# Patient Record
Sex: MALE | Race: WHITE | Employment: OTHER | RURAL
[De-identification: names, ages, dates, MRNs, and addresses within clinical notes are randomized per-mention and may not be internally consistent; named-entity substitution may affect disease eponyms.]

---

## 2017-02-13 RX ORDER — CITALOPRAM 20 MG/1
20 TABLET, FILM COATED ORAL DAILY
COMMUNITY
Start: 2016-11-14 | End: 2017-02-13 | Stop reason: SDUPTHER

## 2017-02-13 RX ORDER — GLIMEPIRIDE 4 MG/1
4 TABLET ORAL 2 TIMES DAILY
COMMUNITY
Start: 2016-11-14 | End: 2017-02-13 | Stop reason: SDUPTHER

## 2017-02-13 RX ORDER — CITALOPRAM 20 MG/1
20 TABLET, FILM COATED ORAL DAILY
Qty: 7 TAB | Refills: 0 | Status: SHIPPED | OUTPATIENT
Start: 2017-02-13 | End: 2017-02-28 | Stop reason: ALTCHOICE

## 2017-02-13 RX ORDER — AMLODIPINE BESYLATE 10 MG/1
10 TABLET ORAL DAILY
COMMUNITY
Start: 2016-11-14 | End: 2017-02-13 | Stop reason: SDUPTHER

## 2017-02-13 RX ORDER — AMLODIPINE BESYLATE 10 MG/1
10 TABLET ORAL DAILY
Qty: 7 TAB | Refills: 0 | Status: SHIPPED | OUTPATIENT
Start: 2017-02-13 | End: 2017-02-28 | Stop reason: SDUPTHER

## 2017-02-13 RX ORDER — GLIMEPIRIDE 4 MG/1
TABLET ORAL
Qty: 14 TAB | Refills: 0 | Status: SHIPPED | OUTPATIENT
Start: 2017-02-13 | End: 2017-02-28 | Stop reason: SDUPTHER

## 2017-02-28 ENCOUNTER — OFFICE VISIT (OUTPATIENT)
Dept: FAMILY MEDICINE CLINIC | Age: 42
End: 2017-02-28

## 2017-02-28 VITALS
BODY MASS INDEX: 28.14 KG/M2 | TEMPERATURE: 97 F | DIASTOLIC BLOOD PRESSURE: 96 MMHG | HEIGHT: 69 IN | RESPIRATION RATE: 22 BRPM | OXYGEN SATURATION: 97 % | WEIGHT: 190 LBS | HEART RATE: 82 BPM | SYSTOLIC BLOOD PRESSURE: 124 MMHG

## 2017-02-28 DIAGNOSIS — E11.9 TYPE 2 DIABETES MELLITUS WITHOUT COMPLICATION, WITHOUT LONG-TERM CURRENT USE OF INSULIN (HCC): Primary | ICD-10-CM

## 2017-02-28 DIAGNOSIS — I10 ESSENTIAL HYPERTENSION: ICD-10-CM

## 2017-02-28 DIAGNOSIS — F41.9 ANXIETY: ICD-10-CM

## 2017-02-28 DIAGNOSIS — E78.00 PURE HYPERCHOLESTEROLEMIA: ICD-10-CM

## 2017-02-28 DIAGNOSIS — F17.200 SMOKING: ICD-10-CM

## 2017-02-28 DIAGNOSIS — Z23 ENCOUNTER FOR IMMUNIZATION: ICD-10-CM

## 2017-02-28 RX ORDER — PRAVASTATIN SODIUM 10 MG/1
TABLET ORAL
Refills: 2 | COMMUNITY
Start: 2017-01-16 | End: 2017-02-28 | Stop reason: SDUPTHER

## 2017-02-28 RX ORDER — NORTRIPTYLINE HYDROCHLORIDE 25 MG/1
25 CAPSULE ORAL
Qty: 30 CAP | Refills: 11 | Status: SHIPPED | OUTPATIENT
Start: 2017-02-28 | End: 2017-06-27 | Stop reason: ALTCHOICE

## 2017-02-28 RX ORDER — LOSARTAN POTASSIUM 100 MG/1
TABLET ORAL
Qty: 30 TAB | Refills: 11 | Status: SHIPPED | OUTPATIENT
Start: 2017-02-28 | End: 2018-03-29 | Stop reason: ALTCHOICE

## 2017-02-28 RX ORDER — PRAVASTATIN SODIUM 10 MG/1
TABLET ORAL
Qty: 30 TAB | Refills: 11 | Status: SHIPPED | OUTPATIENT
Start: 2017-02-28 | End: 2018-03-03 | Stop reason: SDUPTHER

## 2017-02-28 RX ORDER — GLIMEPIRIDE 4 MG/1
TABLET ORAL
Qty: 60 TAB | Refills: 11 | Status: SHIPPED | OUTPATIENT
Start: 2017-02-28 | End: 2018-03-03 | Stop reason: SDUPTHER

## 2017-02-28 RX ORDER — METFORMIN HYDROCHLORIDE 1000 MG/1
TABLET ORAL
Refills: 6 | COMMUNITY
Start: 2017-02-10 | End: 2017-02-28 | Stop reason: SDUPTHER

## 2017-02-28 RX ORDER — PRAVASTATIN SODIUM 10 MG/1
TABLET ORAL
COMMUNITY
Start: 2016-11-14 | End: 2016-11-14

## 2017-02-28 RX ORDER — LOSARTAN POTASSIUM 100 MG/1
TABLET ORAL
Refills: 9 | COMMUNITY
Start: 2017-02-12 | End: 2017-02-28 | Stop reason: SDUPTHER

## 2017-02-28 RX ORDER — AMLODIPINE BESYLATE 10 MG/1
10 TABLET ORAL DAILY
Qty: 30 TAB | Refills: 11 | Status: SHIPPED | OUTPATIENT
Start: 2017-02-28 | End: 2018-03-03 | Stop reason: SDUPTHER

## 2017-02-28 RX ORDER — CITALOPRAM 20 MG/1
20 TABLET, FILM COATED ORAL DAILY
Qty: 7 TAB | Refills: 0 | Status: CANCELLED | OUTPATIENT
Start: 2017-02-28

## 2017-02-28 RX ORDER — METFORMIN HYDROCHLORIDE 1000 MG/1
TABLET ORAL
Qty: 45 TAB | Refills: 11 | Status: SHIPPED | OUTPATIENT
Start: 2017-02-28 | End: 2017-04-14 | Stop reason: SDUPTHER

## 2017-02-28 RX ORDER — LOSARTAN POTASSIUM 100 MG/1
TABLET ORAL
COMMUNITY
Start: 2016-11-14 | End: 2016-11-14

## 2017-02-28 RX ORDER — METFORMIN HYDROCHLORIDE 1000 MG/1
TABLET ORAL
COMMUNITY
Start: 2016-11-14 | End: 2016-11-14

## 2017-02-28 NOTE — MR AVS SNAPSHOT
Visit Information Date & Time Provider Department Dept. Phone Encounter #  
 2/28/2017  1:00 PM Aditya Anrdade, [facility]. [facility] 58 [id number] Follow-up Instructions Return in about 2 months (around 4/28/2017). Upcoming Health Maintenance Date Due HEMOGLOBIN A1C Q6M 1974 LIPID PANEL Q1 1974 FOOT EXAM Q1 8/21/1984 MICROALBUMIN Q1 8/21/1984 EYE EXAM RETINAL OR DILATED Q1 8/21/1984 Pneumococcal 19-64 Medium Risk (1 of 1 - PPSV23) 8/21/1993 DTaP/Tdap/Td series (1 - Tdap) 8/21/1995 INFLUENZA AGE 9 TO ADULT 9/16/2017* *Topic was postponed. The date shown is not the original due date. Allergies as of 2/28/2017  Review Complete On: 2/28/2017 By: Aditya Andrade MD  
 No Known Allergies Current Immunizations  Never Reviewed Name Date Pneumococcal Conjugate (PCV-13)  Incomplete Not reviewed this visit You Were Diagnosed With   
  
 Codes Comments Type 2 diabetes mellitus without complication, without long-term current use of insulin (HCC)    -  Primary ICD-10-CM: E11.9 ICD-9-CM: 250.00 Anxiety     ICD-10-CM: F41.9 ICD-9-CM: 300.00 Essential hypertension     ICD-10-CM: I10 
ICD-9-CM: 401.9 Pure hypercholesterolemia     ICD-10-CM: E78.00 ICD-9-CM: 272.0 Smoking     ICD-10-CM: F17.200 ICD-9-CM: 305.1 Encounter for immunization     ICD-10-CM: O68 ICD-9-CM: V03.89 Vitals BP  
  
  
  
  
  
 (!) 124/96 (BP 1 Location: Left arm, BP Patient Position: Sitting) BMI and BSA Data Body Mass Index Body Surface Area  
 28.47 kg/m 2 2.04 m 2 Preferred Pharmacy Pharmacy Name Phone [address], [address] AT [facility]  [address] [facility]. Nancy Vaughnlobito [phone] Your Updated Medication List  
  
   
This list is accurate as of: 2/28/17  2:14 PM.  Always use your most recent med list.  
  
  
  
  
 amLODIPine 10 mg tablet Commonly known as:  Laly Subramanian Take 1 Tab by mouth daily. Indications: pressure  
  
 glimepiride 4 mg tablet Commonly known as:  AMARYL  
4 mg 2 times per day  
  
 losartan 100 mg tablet Commonly known as:  COZAAR  
1 po daily for pressure  
  
 metFORMIN 1,000 mg tablet Commonly known as:  GLUCOPHAGE  
1.5 tab in AM and 1 in evening for sugar  Indications: sugar  
  
 nortriptyline 25 mg capsule Commonly known as:  PAMELOR Take 1 Cap by mouth nightly. Indications: nerves and sleep  
  
 pravastatin 10 mg tablet Commonly known as:  PRAVACHOL TK 1 T PO AT BEDTIME FOR CHOLESTEROL Prescriptions Sent to Pharmacy Refills  
 losartan (COZAAR) 100 mg tablet 11 Si po daily for pressure Class: Normal  
 Pharmacy: [address]  Ph #: [phone]  
 metFORMIN (GLUCOPHAGE) 1,000 mg tablet 11 Si.5 tab in AM and 1 in evening for sugar  Indications: sugar Class: Normal  
 Pharmacy: [address]  Ph #: [phone]  
 pravastatin (PRAVACHOL) 10 mg tablet 11 Sig: TK 1 T PO AT BEDTIME FOR CHOLESTEROL Class: Normal  
 Pharmacy: [address]  Ph #: [phone]  
 amLODIPine (NORVASC) 10 mg tablet 11 Sig: Take 1 Tab by mouth daily. Indications: pressure Class: Normal  
 Pharmacy: [address]  Ph #: [phone] Route: Oral  
 glimepiride (AMARYL) 4 mg tablet 11 Si mg 2 times per day Class: Normal  
 Pharmacy: [address]  H[address] Ph #: [phone]  
 nortriptyline (PAMELOR) 25 mg capsule 11  
 Sig: Take 1 Cap by mouth nightly. Indications: nerves and sleep Class: Normal  
 Pharmacy: [email or web address] [place] [address], [address] [facility] [address] [place] Ph #: [phone] Route: Oral  
  
We Performed the Following HEMOGLOBIN A1C WITH EAG [50706 CPT(R)]  DIABETES FOOT EXAM [HM7 Custom] LIPID PANEL [77434 CPT(R)] METABOLIC PANEL, COMPREHENSIVE [97866 CPT(R)] MICROALBUMIN, UR, RAND W/ MICROALBUMIN/CREA RATIO [id number] CPT(R)] PNEUMOCOCCAL CONJ VACCINE 13 VALENT IM [id number] CPT(R)] Follow-up Instructions Return in about 2 months (around 4/28/2017). Patient Instructions Pneumococcal 13-Valent Vaccine, Diphtheria Conjugate (By injection) Pneumococcal 13-Valent Vaccine, Diphtheria Conjugate (QYC-jdg-WYX-al 13-VAY-lent VAX-een, dif-THEER-ee-a TEP-sjd-zxft) Prevents infections, such as pneumonia and meningitis. Brand Name(s):Prevnar 13 There may be other brand names for this medicine. When This Medicine Should Not Be Used: This vaccine is not right for everyone. You should not receive this vaccine if you had an allergic reaction to pneumococcal or diphtheria vaccine. How to Use This Medicine:  
Injectable · A nurse or other health provider will give you this medicine. This vaccine is usually given as a shot into a muscle in the thigh or upper arm. · The vaccine schedule is different for different people. ¨ Tell your doctor if your child was born prematurely. Children who were premature may need to follow a different schedule. ¨ Children under 6: This vaccine is usually given as 3 or 4 separate shots over several months. Your child's doctor will tell you how many shots are needed and when to come back for the next one. ¨ Children over 6: This vaccine is given as a single shot. If your child recently received another pneumonia vaccine, this one should be given at least 8 weeks later. ¨ Adults over 50: This vaccine is given as a single dose. · It is very important for your child to receive all of the shots for the vaccine. · Missed dose: This vaccine must be given on a fixed schedule. If your child misses a dose, call your child's doctor for another appointment. Drugs and Foods to Avoid: Ask your doctor or pharmacist before using any other medicine, including over-the-counter medicines, vitamins, and herbal products. · Some medicines can affect how this vaccine works. Tell your doctor if you are receiving a treatment or medicine that causes a weak immune system. This includes radiation treatment, steroid medicine (such as hydrocortisone, methylprednisolone, prednisolone, prednisone), or cancer medicine. Warnings While Using This Medicine: · Adults and adolescents: Tell your doctor if you are pregnant or breastfeeding. · Tell your doctor if you have a weak immune system. You may not be fully protected by this vaccine. Possible Side Effects While Using This Medicine:  
Call your doctor right away if you notice any of these side effects: · Allergic reaction: Itching or hives, swelling in your face or hands, swelling or tingling in your mouth or throat, chest tightness, trouble breathing · High fever If you notice these less serious side effects, talk with your doctor: · Crying, irritability, or fussiness · Joint or muscle pain · Mild skin rash · Pain, burning, redness, or swelling where the shot was given · Poor appetite · Sleep changes If you notice other side effects that you think are caused by this medicine, tell your doctor. Call your doctor for medical advice about side effects. You may report side effects to FDA at 8-454-CEN-0165 © 2016 [address] is for End User's use only and may not be sold, redistributed or otherwise used for commercial purposes. The above information is an  only.  It is not intended as medical advice for individual conditions or treatments. Talk to your doctor, nurse or pharmacist before following any medical regimen to see if it is safe and effective for you. Introducing [facility] & HEALTH SERVICES! Jaci Griffith introduces [employer] patient portal. Now you can access parts of your medical record, email your doctor's office, and request medication refills online. 1. In your internet browser, go to [email or web address] 2. Click on the First Time User? Click Here link in the Sign In box. You will see the New Member Sign Up page. 3. Enter your [employer] Access Code exactly as it appears below. You will not need to use this code after youve completed the sign-up process. If you do not sign up before the expiration date, you must request a new code. · [employer] Access Code: [email or web address] Expires: 5/29/2017  2:14 PM 
 
4. Enter the last four digits of your Social Security Number (xxxx) and Date of Birth (mm/dd/yyyy) as indicated and click Submit. You will be taken to the next sign-up page. 5. Create a [employer] ID. This will be your [employer] login ID and cannot be changed, so think of one that is secure and easy to remember. 6. Create a [employer] password. You can change your password at any time. 7. Enter your Password Reset Question and Answer. This can be used at a later time if you forget your password. 8. Enter your e-mail address. You will receive e-mail notification when new information is available in [address] 9. Click Sign Up. You can now view and download portions of your medical record. 10. Click the Download Summary menu link to download a portable copy of your medical information. If you have questions, please visit the Frequently Asked Questions section of the [employer] website. Remember, [employer] is NOT to be used for urgent needs. For medical emergencies, dial 911. Now available from your iPhone and Android! Please provide this summary of care documentation to your next provider. Your primary care clinician is listed as Turner Mooney. If you have any questions after today's visit, please call [phone].

## 2017-02-28 NOTE — PROGRESS NOTES
Neo Lopez is a 42 y.o. male presenting for/with:    Establish Care (transfer from [facility])    HPI:  Diabetes. Sugars controlled moderately  Hypoglycemia: none  Tolerating current treatment well  Current medications include metformin    No results found for: HBA1C, HGBE8, GLU, GESTF, GLUCPOC, MCALPOCT, MCACR, MCA1, MCA2, MCA3, MCA4, UMCA, MCAU, MCACRPOC, MICALBRAT, LDL, DLDL, LDLC, DLDLP, BISI, CREAPOC, MCREA, REFC7, ACREA, CREA, REFC3, REFC4, [id number]  No results found for: MCACR, MCA1, MCA2, MCA3, MCA4, UMCA, MCAU, MICALBRAT, MCAU2, MCALPOCT    Last eye exam performed  >1 year ago and was normal.  Last foot exam performed greather than 1 year ago. Hypertension. Blood pressures have been stable. Management at last visit included continuing current regimen . Current regimen: angiotensin II receptor antagonist and calcium channel blocker. Symptoms include no symptoms. Patient denies chest pain, palpitations, peripheral edema. Lab review: No results found for: NA, K, CL, CO2, AGAP, GLU, BUN, CREA, BUCR, GFRAA, GFRNA, CA, GFRAA    Hyperlipidemia. On pravastatin 10. Kasey well. No myalgias, arthralgias, unusual weakness. No results found for: CHOL, CHOLPOCT, CHOLX, CHLST, CHOLV, HDL, HDLPOC, LDL, LDLCPOC, NLDLCT, DLDL, LDLC, DLDLP, VLDLC, VLDL, TGL, TGLX, TRIGL, [id number], TRIGP, TGLPOCT, CHHD, CHHDX    No results found for: GPT, ALT, SGOT, GGT, GGTP, AP, APIT, APX, CBIL, TBIL, TBILI      Patient Active Problem List   Diagnosis Code    Hypertension I10    Anxiety F41.9    Type 2 diabetes mellitus without complication, without long-term current use of insulin (HCC) E11.9    Smoking F17.200    Pure hypercholesterolemia E78.00      reports that he has been smoking. He has a 37.50 pack-year smoking history. He does not have any smokeless tobacco history on file. He reports that he does not use illicit drugs.     Family History   Problem Relation Age of Onset    Heart Disease Father      Current Outpatient Prescriptions   Medication Sig    losartan (COZAAR) 100 mg tablet TK 1 T PO QD    metFORMIN (GLUCOPHAGE) 1,000 mg tablet     pravastatin (PRAVACHOL) 10 mg tablet TK 1 T PO AT BEDTIME FOR CHOLESTEROL    amLODIPine (NORVASC) 10 mg tablet Take 1 Tab by mouth daily.  glimepiride (AMARYL) 4 mg tablet 4 mg 2 times per day    citalopram (CELEXA) 20 mg tablet Take 1 Tab by mouth daily. No current facility-administered medications for this visit. No Known Allergies    Review of Systems:    Constitutional: Negative for fever and negative for weight loss. Eyes:  Negative for glaucoma and negative for recent changes in vision. ENT: negative for frequent ear infections, negative for frequent sinus infections, and negative for frequent sore throat. Cardiovascular:  Negative for chest pain or discomfort in chest and negative shortness of breath. Respiratory: negative for asthma, negative for bronchitis, negative for pneumonia,  negative for pleurisy, and  negative for TB. Gastrointestinal:  Negative for frequent indigestion or heartburn, negative for vomiting, and negative for bloody or black stools. Genitourinary:  Negative for hematuria and negative for dysuria. Musculoskeletal:  Negative for frequent fractures or sprains and negative for history of arthritis. Integument:  Negative for recent rash and negative for skin changes  Neurologic:  Negative for frequent headaches and negative for history of seizures or convulsions. Psychiatric:  POS for treatment for psychiatric problems, and negative for history of drug or alcohol treatment. Endocrine:  Negative for decreased energy and negative for dizziness. Hematology/Lymphatic:  Negative for easy bruising or bleeding. Allergic/Immunologic:  Negative for severe allergic reaction, and  negative for hay fever/allergies.     Visit Vitals    BP (!) 124/96 (BP 1 Location: Left arm, BP Patient Position: Sitting)    Pulse 82    Temp 97 °F (36.1 °C) (Temporal)    Resp 22    Ht 5' 8.5\" (1.74 m)    Wt 190 lb (86.2 kg)    SpO2 97%    BMI 28.47 kg/m2     Wt Readings from Last 3 Encounters:   02/28/17 190 lb (86.2 kg)     Physical Examination: General appearance - alert, well appearing, and in no distress  Mental status - alert, oriented to person, place, and time  Eyes - pupils equal and reactive, extraocular eye movements intact  ENT - bilateral external ears and nose normal. Normal lips  Neck - supple, no significant adenopathy, no thyromegaly or mass  Lymphatics - no palpable lymphadenopathy, no hepatosplenomegaly  Chest - clear to auscultation, no wheezes, rales or rhonchi, symmetric air entry  Heart - normal rate, regular rhythm, normal S1, S2, no murmurs, rubs, clicks or gallops  Abd - NABS. SNT, no HSM/Mass. Extremities - peripheral pulses normal, no pedal edema, no clubbing or cyanosis    A/P:  DM2  Fair control. con't current tx, check labs, adjust PRN. HLD  well controlled. con't current tx. Check labs, adjust PRN    HTN  A little up today. BP checks, if persistently >140/90, plan change to losartan /12.5    Anxiety  With restlessness. Not getting good control with citalopram. Discussed options. Try change to pamelor 25mg qhs, but if not richi well, consider zoloft 25's. F/U 2mo.

## 2017-02-28 NOTE — PATIENT INSTRUCTIONS
Pneumococcal 13-Valent Vaccine, Diphtheria Conjugate (By injection)   Pneumococcal 13-Valent Vaccine, Diphtheria Conjugate (IMH-yjq-JWT-al 13-VAY-lent VAX-een, dif-THEER-ee-a JKK-lxv-srcm)  Prevents infections, such as pneumonia and meningitis. Brand Name(s):Prevnar 13   There may be other brand names for this medicine. When This Medicine Should Not Be Used: This vaccine is not right for everyone. You should not receive this vaccine if you had an allergic reaction to pneumococcal or diphtheria vaccine. How to Use This Medicine:   Injectable  · A nurse or other health provider will give you this medicine. This vaccine is usually given as a shot into a muscle in the thigh or upper arm. · The vaccine schedule is different for different people. ¨ Tell your doctor if your child was born prematurely. Children who were premature may need to follow a different schedule. ¨ Children under 6: This vaccine is usually given as 3 or 4 separate shots over several months. Your child's doctor will tell you how many shots are needed and when to come back for the next one. ¨ Children over 6: This vaccine is given as a single shot. If your child recently received another pneumonia vaccine, this one should be given at least 8 weeks later. ¨ Adults over 50: This vaccine is given as a single dose. · It is very important for your child to receive all of the shots for the vaccine. · Missed dose: This vaccine must be given on a fixed schedule. If your child misses a dose, call your child's doctor for another appointment. Drugs and Foods to Avoid:   Ask your doctor or pharmacist before using any other medicine, including over-the-counter medicines, vitamins, and herbal products. · Some medicines can affect how this vaccine works. Tell your doctor if you are receiving a treatment or medicine that causes a weak immune system.  This includes radiation treatment, steroid medicine (such as hydrocortisone, methylprednisolone, prednisolone, prednisone), or cancer medicine. Warnings While Using This Medicine:   · Adults and adolescents: Tell your doctor if you are pregnant or breastfeeding. · Tell your doctor if you have a weak immune system. You may not be fully protected by this vaccine. Possible Side Effects While Using This Medicine:   Call your doctor right away if you notice any of these side effects:  · Allergic reaction: Itching or hives, swelling in your face or hands, swelling or tingling in your mouth or throat, chest tightness, trouble breathing  · High fever  If you notice these less serious side effects, talk with your doctor:   · Crying, irritability, or fussiness  · Joint or muscle pain  · Mild skin rash  · Pain, burning, redness, or swelling where the shot was given  · Poor appetite  · Sleep changes  If you notice other side effects that you think are caused by this medicine, tell your doctor. Call your doctor for medical advice about side effects. You may report side effects to FDA at 6-698-FDA-6652  © 2016 [address] is for End User's use only and may not be sold, redistributed or otherwise used for commercial purposes. The above information is an  only. It is not intended as medical advice for individual conditions or treatments. Talk to your doctor, nurse or pharmacist before following any medical regimen to see if it is safe and effective for you.

## 2017-03-01 LAB
ALBUMIN SERPL-MCNC: 4.4 G/DL (ref 3.5–5.5)
ALBUMIN/CREAT UR: 68.2 MG/G CREAT (ref 0–30)
ALBUMIN/GLOB SERPL: 2.1 {RATIO} (ref 1.1–2.5)
ALP SERPL-CCNC: 119 IU/L (ref 39–117)
ALT SERPL-CCNC: 11 IU/L (ref 0–44)
AST SERPL-CCNC: 12 IU/L (ref 0–40)
BILIRUB SERPL-MCNC: 0.4 MG/DL (ref 0–1.2)
BUN SERPL-MCNC: 13 MG/DL (ref 6–24)
BUN/CREAT SERPL: 21 (ref 9–20)
CALCIUM SERPL-MCNC: 9.8 MG/DL (ref 8.7–10.2)
CHLORIDE SERPL-SCNC: 101 MMOL/L (ref 96–106)
CHOLEST SERPL-MCNC: 138 MG/DL (ref 100–199)
CO2 SERPL-SCNC: 21 MMOL/L (ref 18–29)
CREAT SERPL-MCNC: 0.62 MG/DL (ref 0.76–1.27)
CREAT UR-MCNC: 290.4 MG/DL
EST. AVERAGE GLUCOSE BLD GHB EST-MCNC: 315 MG/DL
GLOBULIN SER CALC-MCNC: 2.1 G/DL (ref 1.5–4.5)
GLUCOSE SERPL-MCNC: 206 MG/DL (ref 65–99)
HBA1C MFR BLD: 12.6 % (ref 4.8–5.6)
HDLC SERPL-MCNC: 35 MG/DL
LDLC SERPL CALC-MCNC: 70 MG/DL (ref 0–99)
MICROALBUMIN UR-MCNC: 198 UG/ML
POTASSIUM SERPL-SCNC: 4.5 MMOL/L (ref 3.5–5.2)
PROT SERPL-MCNC: 6.5 G/DL (ref 6–8.5)
SODIUM SERPL-SCNC: 141 MMOL/L (ref 134–144)
TRIGL SERPL-MCNC: 164 MG/DL (ref 0–149)
VLDLC SERPL CALC-MCNC: 33 MG/DL (ref 5–40)

## 2017-03-04 NOTE — PROGRESS NOTES
Sugar very high. Mild protein leak due to sugar damage to kidney. Salt, cholesterol, liver levels all ok. Add januvia to regimen.

## 2017-03-06 ENCOUNTER — TELEPHONE (OUTPATIENT)
Dept: FAMILY MEDICINE CLINIC | Age: 42
End: 2017-03-06

## 2017-03-06 NOTE — TELEPHONE ENCOUNTER
There are cheaper meds, but they are unlikely to get the improvement pt needs, and cause weight gain too. In my best judgement, control of his high sugar will require either something like januvia (onglyza, tradjenta are also acceptable, pt can call his insurance to see if either are at a better price point) or shots like bydureon or insulin, which are both substantially expensive as well.

## 2017-04-13 ENCOUNTER — TELEPHONE (OUTPATIENT)
Dept: FAMILY MEDICINE CLINIC | Age: 42
End: 2017-04-13

## 2017-04-13 NOTE — TELEPHONE ENCOUNTER
Patient advised on Td date. Does he need one after getting cut with galvanized metal?  Can he get one tomorrow?

## 2017-04-13 NOTE — TELEPHONE ENCOUNTER
PATY HAD A LACERATION THAT DID NOT NEED STITCHES BUT WONDERED WHEN HIS LAST TT WAS. PLEASE CALL EZEQUIEL AND LET HER KNOW. YOU MAY LEAVE A MESSAGE.   [phone]

## 2017-04-14 ENCOUNTER — TELEPHONE (OUTPATIENT)
Dept: FAMILY MEDICINE CLINIC | Age: 42
End: 2017-04-14

## 2017-04-14 DIAGNOSIS — E11.9 TYPE 2 DIABETES MELLITUS WITHOUT COMPLICATION, WITHOUT LONG-TERM CURRENT USE OF INSULIN (HCC): Primary | ICD-10-CM

## 2017-04-14 RX ORDER — METFORMIN HYDROCHLORIDE 1000 MG/1
TABLET ORAL
Qty: 75 TAB | Refills: 11 | Status: SHIPPED | OUTPATIENT
Start: 2017-04-14 | End: 2018-03-29 | Stop reason: SDUPTHER

## 2017-04-14 NOTE — TELEPHONE ENCOUNTER
Pt called and stated he only received a 18 day supply with the order given for Metformin. Please advise.

## 2017-06-27 ENCOUNTER — OFFICE VISIT (OUTPATIENT)
Dept: FAMILY MEDICINE CLINIC | Age: 42
End: 2017-06-27

## 2017-06-27 VITALS
WEIGHT: 178.4 LBS | DIASTOLIC BLOOD PRESSURE: 86 MMHG | HEART RATE: 88 BPM | RESPIRATION RATE: 18 BRPM | TEMPERATURE: 98 F | OXYGEN SATURATION: 98 % | HEIGHT: 69 IN | BODY MASS INDEX: 26.42 KG/M2 | SYSTOLIC BLOOD PRESSURE: 122 MMHG

## 2017-06-27 DIAGNOSIS — I10 ESSENTIAL HYPERTENSION: ICD-10-CM

## 2017-06-27 DIAGNOSIS — E11.9 TYPE 2 DIABETES MELLITUS WITHOUT COMPLICATION, WITHOUT LONG-TERM CURRENT USE OF INSULIN (HCC): Primary | ICD-10-CM

## 2017-06-27 DIAGNOSIS — E78.00 PURE HYPERCHOLESTEROLEMIA: ICD-10-CM

## 2017-06-27 DIAGNOSIS — F41.9 ANXIETY: ICD-10-CM

## 2017-06-27 DIAGNOSIS — B35.6 TINEA CRURIS: ICD-10-CM

## 2017-06-27 RX ORDER — CHLORPHENIRAMINE MALEATE 4 MG
TABLET ORAL 2 TIMES DAILY
Qty: 45 G | Refills: 3 | Status: SHIPPED | OUTPATIENT
Start: 2017-06-27 | End: 2019-04-08 | Stop reason: ALTCHOICE

## 2017-06-27 RX ORDER — VENLAFAXINE HYDROCHLORIDE 37.5 MG/1
37.5 CAPSULE, EXTENDED RELEASE ORAL DAILY
Qty: 30 CAP | Refills: 11 | Status: SHIPPED | OUTPATIENT
Start: 2017-06-27 | End: 2018-03-29 | Stop reason: SDUPTHER

## 2017-06-27 NOTE — PATIENT INSTRUCTIONS
Keep up the good work! Jock Itch: Care Instructions  Your Care Instructions  Jock itch is a fungal infection of the groin. The fungus that causes jock itch lives on your skin. It often affects male athletes, but anyone can get jock itch. You may get an itchy rash on your inner thighs and rear end (buttocks). It spreads and starts to itch when you sweat or are in steamy showers or locker rooms. Jock itch should end soon if you keep your skin dry after you clean it. You can treat jock itch at home with antifungal creams and powders that you can buy without a prescription. Follow-up care is a key part of your treatment and safety. Be sure to make and go to all appointments, and call your doctor if you are having problems. It's also a good idea to know your test results and keep a list of the medicines you take. How can you care for yourself at home? · Wash the rash with soap and water. · Wet a soft cloth with cool water alone or mixed with baking soda or essential oils such as lavender or cassi. Put the cool compress on the skin to relieve itching. · If you have large areas of blisters or sores, use compresses such as those made with Burow's solution (available without a prescription) to soothe and dry out the blisters. · Spread antifungal cream or powder over, and beyond the edge of, the rash. Follow the directions on the package. · If your doctor prescribed medicine, take it exactly as directed. Call your doctor if you have any problems with your medicine. · Try not to scratch the rash. · Shower or bathe daily and after you exercise. · Keep your skin dry as much as possible to allow it to heal.  · Until your jock itch is cured, wear loose-fitting cotton clothing. Avoid tight underwear, pants, and panty hose. · Wash your supporters and shorts after every wearing. · Do not share clothing, sports equipment, towels, or sheets to avoid spreading the fungi to other people.   To prevent jock itch  · Put on socks before you put on underwear if you have athlete's foot. This action helps prevent the fungus on your feet from spreading to your groin. · Wash your workout clothes, underwear, socks, and towels after each use. · Keep your groin, inner thighs, and buttocks clean and dry, especially after you exercise and shower. · Use a powder on your groin, inner thighs, and buttocks to help keep these areas dry. · Do not borrow or lend clothing, sports equipment, towels, or sheets. · Wear slippers or sandals in locker rooms, showers, and public bathing areas. When should you call for help? Call your doctor now or seek immediate medical care if:  · You have signs of infection, such as:  ¨ Increased pain, swelling, warmth, or redness. ¨ Red streaks leading from the rash. ¨ Pus draining from the rash. ¨ A fever. Watch closely for changes in your health, and be sure to contact your doctor if:  · You do not get better as expected. Where can you learn more? Go to http://miesha-deonte.info/. Enter G303 in the search box to learn more about \"Jock Itch: Care Instructions. \"  Current as of: October 13, 2016  Content Version: 11.3  © 0671-3284 [employer]. Care instructions adapted under license by [employer] (which disclaims liability or warranty for this information). If you have questions about a medical condition or this instruction, always ask your healthcare professional. [place] [address] any warranty or liability for your use of this information.

## 2017-06-27 NOTE — PROGRESS NOTES
Claudean Arrant is a 42 y.o. male presenting for/with: Follow-up (nortriptyline not working)    HPI:  Diabetes. Sugars controlled moderately, running mid to high 100's. occ 300 about 2x/week. Hypoglycemia: none  Tolerating current treatment well  Current medications include metformin (max dose) and januvia, taking regularly. Kasey well. Lab Results   Component Value Date/Time    Hemoglobin A1c 12.6 02/28/2017 02:09 PM    Glucose 206 02/28/2017 02:09 PM    Microalb/Creat ratio (ug/mg creat.) 68.2 02/28/2017 02:09 PM    LDL, calculated 70 02/28/2017 02:09 PM    Creatinine 0.62 02/28/2017 02:09 PM     Lab Results   Component Value Date/Time    Microalb/Creat ratio (ug/mg creat.) 68.2 02/28/2017 02:09 PM     Last eye exam performed  >1 year ago and was normal.  Last foot exam performed greather than 1 year ago. Hypertension. Blood pressures have been stable. Management at last visit included continuing current regimen . Current regimen: angiotensin II receptor antagonist and calcium channel blocker. Symptoms include no symptoms. Patient denies chest pain, palpitations, peripheral edema. Lab review:   Lab Results   Component Value Date/Time    Sodium 141 02/28/2017 02:09 PM    Potassium 4.5 02/28/2017 02:09 PM    Chloride 101 02/28/2017 02:09 PM    CO2 21 02/28/2017 02:09 PM    Glucose 206 02/28/2017 02:09 PM    BUN 13 02/28/2017 02:09 PM    Creatinine 0.62 02/28/2017 02:09 PM    BUN/Creatinine ratio 21 02/28/2017 02:09 PM    GFR est  02/28/2017 02:09 PM    GFR est non- 02/28/2017 02:09 PM    Calcium 9.8 02/28/2017 02:09 PM     Hyperlipidemia. On pravastatin 10. Kasey well. No myalgias, arthralgias, unusual weakness.   Lab Results   Component Value Date/Time    Cholesterol, total 138 02/28/2017 02:09 PM    HDL Cholesterol 35 02/28/2017 02:09 PM    LDL, calculated 70 02/28/2017 02:09 PM    VLDL, calculated 33 02/28/2017 02:09 PM    Triglyceride 164 02/28/2017 02:09 PM       Lab Results   Component Value Date/Time    ALT (SGPT) 11 02/28/2017 02:09 PM    AST (SGOT) 12 02/28/2017 02:09 PM    Alk. phosphatase 119 02/28/2017 02:09 PM    Bilirubin, total 0.4 02/28/2017 02:09 PM     PMH, SH, Medications/Allergies: reviewed, on chart. No Known Allergies    ROS:  Constitutional: No fever, chills or weight loss  Respiratory: No cough, SOB   CV: No chest pain or Palpitations  Skin- has had some jock itch over past month. Tried neosporin, helped some. Visit Vitals    /86 (BP 1 Location: Left arm, BP Patient Position: Sitting)    Pulse 88    Temp 98 °F (36.7 °C)    Resp 18    Ht 5' 8.5\" (1.74 m)    Wt 178 lb 6.4 oz (80.9 kg)    SpO2 98%    BMI 26.73 kg/m2     Wt Readings from Last 3 Encounters:   06/27/17 178 lb 6.4 oz (80.9 kg)   02/28/17 190 lb (86.2 kg)     BP Readings from Last 3 Encounters:   06/27/17 122/86   02/28/17 (!) 124/96     Physical Examination: General appearance - alert, well appearing, and in no distress  Mental status - alert, oriented to person, place, and time  Eyes - pupils equal and reactive, extraocular eye movements intact  ENT - bilateral external ears and nose normal. Normal lips  Neck - supple, no significant adenopathy, no thyromegaly or mass  Lymphatics - no palpable lymphadenopathy, no hepatosplenomegaly  Chest - clear to auscultation, no wheezes, rales or rhonchi, symmetric air entry  Heart - normal rate, regular rhythm, normal S1, S2, no murmurs, rubs, clicks or gallops  Extremities - peripheral pulses normal, no pedal edema, no clubbing or cyanosis  Skin- prepuce with mildly erythematous scaling rash, with some edema and white deposit to the coronal sulcus. A/P:  DM2  Fair control. con't current tx, check labs. Has lost 12#, so anticipate continuing improvement. If still >9, plan change januvia to GLP1 like bydureon. If <9, probably will get to goal with con't wt loss and TLC's, so will stay with max dose metformin and januvia. HLD  well controlled.  con't current tx. Check labs, adjust PRN. HTN  Great today. con't current tx. Anxiety  With restlessness. Doing ok off citalopram, but didn't get a lot out of pamelor 25mg qhs. Try add effexor XR 37.5 QAM.    Tinea cruris/balananitis. tx with clotrimazole, make sure to hit the coronal sulcus. F/U 3mo.

## 2017-06-27 NOTE — MR AVS SNAPSHOT
Visit Information Date & Time Provider Department Dept. Phone Encounter #  
 6/27/2017  2:20 PM Ryanrico RoachMaryam 71 [phone] [id number] Follow-up Instructions Return in about 3 months (around 9/27/2017). Follow-up and Disposition History Upcoming Health Maintenance Date Due  
 EYE EXAM RETINAL OR DILATED Q1 8/21/1984 Pneumococcal 19-64 Medium Risk (1 of 1 - PPSV23) 8/21/1993 DTaP/Tdap/Td series (1 - Tdap) 8/21/1995 INFLUENZA AGE 9 TO ADULT 9/16/2017* HEMOGLOBIN A1C Q6M 8/28/2017 FOOT EXAM Q1 2/28/2018 MICROALBUMIN Q1 2/28/2018 LIPID PANEL Q1 2/28/2018 *Topic was postponed. The date shown is not the original due date. Allergies as of 6/27/2017  Review Complete On: 6/27/2017 By: Ryan Roach MD  
 No Known Allergies Current Immunizations  Never Reviewed Name Date Pneumococcal Conjugate (PCV-13) 2/28/2017  2:14 PM  
  
 Not reviewed this visit You Were Diagnosed With   
  
 Codes Comments Type 2 diabetes mellitus without complication, without long-term current use of insulin (HCC)    -  Primary ICD-10-CM: E11.9 ICD-9-CM: 250.00 Anxiety     ICD-10-CM: F41.9 ICD-9-CM: 300.00 Essential hypertension     ICD-10-CM: I10 
ICD-9-CM: 401.9 Pure hypercholesterolemia     ICD-10-CM: E78.00 ICD-9-CM: 272.0 Tinea cruris     ICD-10-CM: B35.6 ICD-9-CM: 110.3 Vitals BP Pulse Temp Resp Height(growth percentile) Weight(growth percentile) 122/86 (BP 1 Location: Left arm, BP Patient Position: Sitting) 88 98 °F (36.7 °C) 18 5' 8.5\" (1.74 m) 178 lb 6.4 oz (80.9 kg) SpO2 BMI Smoking Status 98% 26.73 kg/m2 Current Every Day Smoker BMI and BSA Data Body Mass Index Body Surface Area  
 26.73 kg/m 2 1.98 m 2 Preferred Pharmacy Pharmacy Name Phone  [address], [address] [facility]  [address] CONCHIS Samuel [phone] Your Updated Medication List  
  
   
This list is accurate as of: 6/27/17  3:02 PM.  Always use your most recent med list. amLODIPine 10 mg tablet Commonly known as:  Opal Heymann Take 1 Tab by mouth daily. Indications: pressure  
  
 clotrimazole 1 % topical cream  
Commonly known as:  Rhetta Plate Apply  to affected area two (2) times a day. glimepiride 4 mg tablet Commonly known as:  AMARYL  
4 mg 2 times per day JANUVIA 100 mg tablet Generic drug:  SITagliptin TAKE 1 TABLET BY MOUTH DAILY FOR SUGAR**ADD TO METFORMIN**  
  
 losartan 100 mg tablet Commonly known as:  COZAAR  
1 po daily for pressure  
  
 metFORMIN 1,000 mg tablet Commonly known as:  GLUCOPHAGE  
1.5 tab in AM and 1 in evening for sugar  Indications: sugar  
  
 pravastatin 10 mg tablet Commonly known as:  PRAVACHOL TK 1 T PO AT BEDTIME FOR CHOLESTEROL  
  
 venlafaxine-SR 37.5 mg capsule Commonly known as:  EFFEXOR-XR Take 1 Cap by mouth daily. Prescriptions Sent to Pharmacy Refills  
 venlafaxine-SR (EFFEXOR-XR) 37.5 mg capsule 11 Sig: Take 1 Cap by mouth daily. Class: Normal  
 Pharmacy: [facility] [address]  Ph #: [phone] Route: Oral  
 clotrimazole (LOTRIMIN) 1 % topical cream 3 Sig: Apply  to affected area two (2) times a day. Class: Normal  
 Pharmacy: [facility] [address]  Ph #: [phone] Route: Topical  
  
We Performed the Following HEMOGLOBIN A1C WITH EAG [45468 CPT(R)] METABOLIC PANEL, BASIC [27397 CPT(R)] Follow-up Instructions Return in about 3 months (around 9/27/2017). Patient Instructions Keep up the good work! Jock Itch: Care Instructions Your Care Instructions Jock itch is a fungal infection of the groin. The fungus that causes jock itch lives on your skin. It often affects male athletes, but anyone can get jock itch. You may get an itchy rash on your inner thighs and rear end (buttocks). It spreads and starts to itch when you sweat or are in steamy showers or locker rooms. Jock itch should end soon if you keep your skin dry after you clean it. You can treat jock itch at home with antifungal creams and powders that you can buy without a prescription. Follow-up care is a key part of your treatment and safety. Be sure to make and go to all appointments, and call your doctor if you are having problems. It's also a good idea to know your test results and keep a list of the medicines you take. How can you care for yourself at home? · Wash the rash with soap and water. · Wet a soft cloth with cool water alone or mixed with baking soda or essential oils such as lavender or cassi. Put the cool compress on the skin to relieve itching. · If you have large areas of blisters or sores, use compresses such as those made with Burow's solution (available without a prescription) to soothe and dry out the blisters. · Spread antifungal cream or powder over, and beyond the edge of, the rash. Follow the directions on the package. · If your doctor prescribed medicine, take it exactly as directed. Call your doctor if you have any problems with your medicine. · Try not to scratch the rash. · Shower or bathe daily and after you exercise. · Keep your skin dry as much as possible to allow it to heal. 
· Until your jock itch is cured, wear loose-fitting cotton clothing. Avoid tight underwear, pants, and panty hose. · Wash your supporters and shorts after every wearing. · Do not share clothing, sports equipment, towels, or sheets to avoid spreading the fungi to other people. To prevent jock itch · Put on socks before you put on underwear if you have athlete's foot. This action helps prevent the fungus on your feet from spreading to your groin. · Wash your workout clothes, underwear, socks, and towels after each use. · Keep your groin, inner thighs, and buttocks clean and dry, especially after you exercise and shower. · Use a powder on your groin, inner thighs, and buttocks to help keep these areas dry. · Do not borrow or lend clothing, sports equipment, towels, or sheets. · Wear slippers or sandals in locker rooms, showers, and public bathing areas. When should you call for help? Call your doctor now or seek immediate medical care if: 
· You have signs of infection, such as: 
¨ Increased pain, swelling, warmth, or redness. ¨ Red streaks leading from the rash. ¨ Pus draining from the rash. ¨ A fever. Watch closely for changes in your health, and be sure to contact your doctor if: 
· You do not get better as expected. Where can you learn more? Go to http://miesha-deonte.info/. Enter G303 in the search box to learn more about \"Jock Itch: Care Instructions. \" Current as of: October 13, 2016 Content Version: 11.3 © 9636-3677 [employer]. Care instructions adapted under license by [employer] (which disclaims liability or warranty for this information). If you have questions about a medical condition or this instruction, always ask your healthcare professional. [place] [address] any warranty or liability for your use of this information. Introducing [facility] & HEALTH SERVICES! Cesar Orozco introduces [employer] patient portal. Now you can access parts of your medical record, email your doctor's office, and request medication refills online. 1. In your internet browser, go to [email or web address] 2. Click on the First Time User? Click Here link in the Sign In box. You will see the New Member Sign Up page. 3. Enter your [employer] Access Code exactly as it appears below.  You will not need to use this code after youve completed the sign-up process. If you do not sign up before the expiration date, you must request a new code. · [employer] Access Code: [email or web address] Expires: 9/25/2017  2:56 PM 
 
4. Enter the last four digits of your Social Security Number (xxxx) and Date of Birth (mm/dd/yyyy) as indicated and click Submit. You will be taken to the next sign-up page. 5. Create a [employer] ID. This will be your [employer] login ID and cannot be changed, so think of one that is secure and easy to remember. 6. Create a [employer] password. You can change your password at any time. 7. Enter your Password Reset Question and Answer. This can be used at a later time if you forget your password. 8. Enter your e-mail address. You will receive e-mail notification when new information is available in [address]. 9. Click Sign Up. You can now view and download portions of your medical record. 10. Click the Download Summary menu link to download a portable copy of your medical information. If you have questions, please visit the Frequently Asked Questions section of the [employer] website. Remember, [employer] is NOT to be used for urgent needs. For medical emergencies, dial 911. Now available from your iPhone and Android! Please provide this summary of care documentation to your next provider. Your primary care clinician is listed as Gamaliel Alegre. If you have any questions after today's visit, please call [phone].

## 2017-06-28 LAB
BUN SERPL-MCNC: 13 MG/DL (ref 6–24)
BUN/CREAT SERPL: 13 (ref 9–20)
CALCIUM SERPL-MCNC: 9.8 MG/DL (ref 8.7–10.2)
CHLORIDE SERPL-SCNC: 101 MMOL/L (ref 96–106)
CO2 SERPL-SCNC: 20 MMOL/L (ref 18–29)
CREAT SERPL-MCNC: 0.98 MG/DL (ref 0.76–1.27)
EST. AVERAGE GLUCOSE BLD GHB EST-MCNC: 246 MG/DL
GLUCOSE SERPL-MCNC: 191 MG/DL (ref 65–99)
HBA1C MFR BLD: 10.2 % (ref 4.8–5.6)
POTASSIUM SERPL-SCNC: 4.6 MMOL/L (ref 3.5–5.2)
SODIUM SERPL-SCNC: 140 MMOL/L (ref 134–144)

## 2017-06-28 NOTE — PROGRESS NOTES
Sugars better, but not close enough to goal. Change januvia to bydureon. Please inform, pt can get sample pen and coupon.

## 2017-06-29 ENCOUNTER — TELEPHONE (OUTPATIENT)
Dept: FAMILY MEDICINE CLINIC | Age: 42
End: 2017-06-29

## 2017-06-29 NOTE — TELEPHONE ENCOUNTER
[phone] contact # renetta Montes  Please call back as she received your message and would like to discuss Mark Anthony's injection  with you and need the information. Angelo Cordon is a [facility] patient that is seeing Dr. Paulina Humphrey there.   Thanks,

## 2017-07-05 ENCOUNTER — CLINICAL SUPPORT (OUTPATIENT)
Dept: FAMILY MEDICINE CLINIC | Age: 42
End: 2017-07-05

## 2017-07-05 DIAGNOSIS — E13.9 OTHER SPECIFIED DIABETES MELLITUS: Primary | ICD-10-CM

## 2017-07-05 NOTE — MR AVS SNAPSHOT
Visit Information Date & Time Provider Department Dept. Phone Encounter #  
 7/5/2017  3:00 PM Ro Delacruz [id number] Your Appointments 9/26/2017  9:10 AM  
ESTABLISHED PATIENT with Cha Hernandez MD  
[address] [facility] Appt Note: 3 mo F/U  
 [address] [place] [address]  
[address] [facility] [address] [address] Upcoming Health Maintenance Date Due  
 EYE EXAM RETINAL OR DILATED Q1 8/21/1984 Pneumococcal 19-64 Medium Risk (1 of 1 - PPSV23) 8/21/1993 DTaP/Tdap/Td series (1 - Tdap) 8/21/1995 INFLUENZA AGE 9 TO ADULT 9/16/2017* HEMOGLOBIN A1C Q6M 12/27/2017 FOOT EXAM Q1 2/28/2018 MICROALBUMIN Q1 2/28/2018 LIPID PANEL Q1 2/28/2018 *Topic was postponed. The date shown is not the original due date. Allergies as of 7/5/2017  Review Complete On: 6/27/2017 By: Cha Hernandez MD  
 No Known Allergies Current Immunizations  Never Reviewed Name Date Pneumococcal Conjugate (PCV-13) 2/28/2017  2:14 PM  
  
 Not reviewed this visit Vitals Smoking Status Current Every Day Smoker Your Updated Medication List  
  
   
This list is accurate as of: 7/5/17  3:13 PM.  Always use your most recent med list. amLODIPine 10 mg tablet Commonly known as:  Job Dub Take 1 Tab by mouth daily. Indications: pressure  
  
 clotrimazole 1 % topical cream  
Commonly known as:  Matias Mighty Apply  to affected area two (2) times a day. exenatide microspheres 2 mg/0.65 mL Pnij Commonly known as:  BYDUREON  
2 mg by SubCUTAneous route every seven (7) days. glimepiride 4 mg tablet Commonly known as:  AMARYL  
4 mg 2 times per day  
  
 losartan 100 mg tablet Commonly known as:  COZAAR  
1 po daily for pressure  
  
 metFORMIN 1,000 mg tablet Commonly known as:  GLUCOPHAGE  
1.5 tab in AM and 1 in evening for sugar  Indications: sugar  
  
 pravastatin 10 mg tablet Commonly known as:  PRAVACHOL TK 1 T PO AT BEDTIME FOR CHOLESTEROL  
  
 venlafaxine-SR 37.5 mg capsule Commonly known as:  EFFEXOR-XR Take 1 Cap by mouth daily. Introducing [facility] & HEALTH SERVICES! Trina Chester introduces [employer] patient portal. Now you can access parts of your medical record, email your doctor's office, and request medication refills online. 1. In your internet browser, go to [email or web address] 2. Click on the First Time User? Click Here link in the Sign In box. You will see the New Member Sign Up page. 3. Enter your [employer] Access Code exactly as it appears below. You will not need to use this code after youve completed the sign-up process. If you do not sign up before the expiration date, you must request a new code. · [employer] Access Code: [email or web address] Expires: 9/25/2017  2:56 PM 
 
4. Enter the last four digits of your Social Security Number (xxxx) and Date of Birth (mm/dd/yyyy) as indicated and click Submit. You will be taken to the next sign-up page. 5. Create a [employer] ID. This will be your [employer] login ID and cannot be changed, so think of one that is secure and easy to remember. 6. Create a [employer] password. You can change your password at any time. 7. Enter your Password Reset Question and Answer. This can be used at a later time if you forget your password. 8. Enter your e-mail address. You will receive e-mail notification when new information is available in [address] 9. Click Sign Up. You can now view and download portions of your medical record. 10. Click the Download Summary menu link to download a portable copy of your medical information. If you have questions, please visit the Frequently Asked Questions section of the [employer] website.  Remember, [employer] is NOT to be used for urgent needs. For medical emergencies, dial 911. Now available from your iPhone and Android! Please provide this summary of care documentation to your next provider. Your primary care clinician is listed as Ruthy Camarillo. If you have any questions after today's visit, please call [phone].

## 2018-03-29 ENCOUNTER — OFFICE VISIT (OUTPATIENT)
Dept: FAMILY MEDICINE CLINIC | Age: 43
End: 2018-03-29

## 2018-03-29 VITALS
TEMPERATURE: 98.9 F | RESPIRATION RATE: 16 BRPM | SYSTOLIC BLOOD PRESSURE: 140 MMHG | HEIGHT: 69 IN | OXYGEN SATURATION: 97 % | WEIGHT: 172 LBS | DIASTOLIC BLOOD PRESSURE: 90 MMHG | HEART RATE: 99 BPM | BODY MASS INDEX: 25.48 KG/M2

## 2018-03-29 DIAGNOSIS — E11.9 TYPE 2 DIABETES MELLITUS WITHOUT COMPLICATION, WITHOUT LONG-TERM CURRENT USE OF INSULIN (HCC): Primary | ICD-10-CM

## 2018-03-29 DIAGNOSIS — F41.9 ANXIETY: ICD-10-CM

## 2018-03-29 DIAGNOSIS — I10 ESSENTIAL HYPERTENSION: ICD-10-CM

## 2018-03-29 DIAGNOSIS — L40.9 PSORIASIS: ICD-10-CM

## 2018-03-29 DIAGNOSIS — E78.00 PURE HYPERCHOLESTEROLEMIA: ICD-10-CM

## 2018-03-29 DIAGNOSIS — F17.200 SMOKING: ICD-10-CM

## 2018-03-29 PROBLEM — E11.21 TYPE 2 DIABETES WITH NEPHROPATHY (HCC): Status: ACTIVE | Noted: 2018-03-29

## 2018-03-29 LAB
ALBUMIN UR QL STRIP: 80 MG/L
CREATININE, URINE POC: 300 MG/DL
MICROALBUMIN/CREAT RATIO POC: NORMAL MG/G

## 2018-03-29 RX ORDER — ZOLPIDEM TARTRATE 5 MG/1
5 TABLET ORAL
Qty: 10 TAB | Refills: 0 | Status: SHIPPED | OUTPATIENT
Start: 2018-03-29 | End: 2018-04-16 | Stop reason: SDUPTHER

## 2018-03-29 RX ORDER — PRAVASTATIN SODIUM 10 MG/1
TABLET ORAL
Qty: 90 TAB | Refills: 3 | Status: SHIPPED | OUTPATIENT
Start: 2018-03-29 | End: 2019-05-13 | Stop reason: SDUPTHER

## 2018-03-29 RX ORDER — VALSARTAN 320 MG/1
320 TABLET ORAL DAILY
Qty: 90 TAB | Refills: 3 | Status: SHIPPED | OUTPATIENT
Start: 2018-03-29 | End: 2018-08-29 | Stop reason: RX

## 2018-03-29 RX ORDER — AMLODIPINE BESYLATE 10 MG/1
TABLET ORAL
Qty: 90 TAB | Refills: 3 | Status: SHIPPED | OUTPATIENT
Start: 2018-03-29 | End: 2019-06-05 | Stop reason: SDUPTHER

## 2018-03-29 RX ORDER — METFORMIN HYDROCHLORIDE 1000 MG/1
TABLET ORAL
Qty: 225 TAB | Refills: 3 | Status: SHIPPED | OUTPATIENT
Start: 2018-03-29 | End: 2019-04-05 | Stop reason: SDUPTHER

## 2018-03-29 RX ORDER — VENLAFAXINE HYDROCHLORIDE 75 MG/1
75 CAPSULE, EXTENDED RELEASE ORAL DAILY
Qty: 90 CAP | Refills: 3 | Status: SHIPPED | OUTPATIENT
Start: 2018-03-29 | End: 2019-04-05 | Stop reason: SDUPTHER

## 2018-03-29 RX ORDER — GUAIFENESIN 100 MG/5ML
81 LIQUID (ML) ORAL DAILY
Qty: 100 TAB | Refills: 3
Start: 2018-03-29 | End: 2020-06-19 | Stop reason: SDUPTHER

## 2018-03-29 RX ORDER — GLIMEPIRIDE 4 MG/1
TABLET ORAL
Qty: 60 TAB | Refills: 11 | Status: SHIPPED | OUTPATIENT
Start: 2018-03-29 | End: 2018-04-02 | Stop reason: ALTCHOICE

## 2018-03-29 RX ORDER — MOMETASONE FUROATE 1 MG/G
OINTMENT TOPICAL
Qty: 45 G | Refills: 3 | Status: SHIPPED | OUTPATIENT
Start: 2018-03-29 | End: 2019-04-08 | Stop reason: ALTCHOICE

## 2018-03-29 NOTE — MR AVS SNAPSHOT
[address] [place] [address] 
[phone] Patient: Alona Cespedes MRN: [id number] :[id number] Visit Information Date & Time Provider Department Dept. Phone Encounter #  
 3/29/2018  7:50 AM Zetta Prader, MD [address] [place] [address] [phone] [id number] Follow-up Instructions Return in about 3 months (around 2018). Follow-up and Disposition History Upcoming Health Maintenance Date Due  
 EYE EXAM RETINAL OR DILATED Q1 1984 DTaP/Tdap/Td series (1 - Tdap) 1995 HEMOGLOBIN A1C Q6M 2017 FOOT EXAM Q1 2018 MICROALBUMIN Q1 2018 LIPID PANEL Q1 2018 Allergies as of 3/29/2018  Review Complete On: 3/29/2018 By: Zetta Prader, MD  
 No Known Allergies Current Immunizations  Never Reviewed Name Date Pneumococcal Conjugate (PCV-13) 2017  2:14 PM  
  
 Not reviewed this visit You Were Diagnosed With   
  
 Codes Comments Type 2 diabetes mellitus without complication, without long-term current use of insulin (HCC)    -  Primary ICD-10-CM: E11.9 ICD-9-CM: 250.00 Anxiety     ICD-10-CM: F41.9 ICD-9-CM: 300.00 Pure hypercholesterolemia     ICD-10-CM: E78.00 ICD-9-CM: 272.0 Essential hypertension     ICD-10-CM: I10 
ICD-9-CM: 401.9 Smoking     ICD-10-CM: F17.200 ICD-9-CM: 305.1 Psoriasis     ICD-10-CM: L40.9 ICD-9-CM: 696.1 Vitals BP Pulse Temp Resp Height(growth percentile) 140/90 (BP 1 Location: Left arm, BP Patient Position: Sitting) 99 98.9 °F (37.2 °C) (Temporal) 16 5' 9\" (1.753 m) Weight(growth percentile) SpO2 BMI Smoking Status 172 lb (78 kg) 97% 25.4 kg/m2 Current Every Day Smoker Vitals History BMI and BSA Data Body Mass Index Body Surface Area  
 25.4 kg/m 2 1.95 m 2 Preferred Pharmacy Pharmacy Name Phone [place] , [address] [facility] [address] Fidencio Ahumada [phone] Your Updated Medication List  
  
   
This list is accurate as of 3/29/18  9:04 AM.  Always use your most recent med list. amLODIPine 10 mg tablet Commonly known as:  Renetta Hair TAKE 1 TABLET BY MOUTH DAILY for pressure  
  
 aspirin 81 mg chewable tablet Take 1 Tab by mouth daily. Indications: heart  
  
 clotrimazole 1 % topical cream  
Commonly known as:  Tigre Naas Apply  to affected area two (2) times a day. exenatide microspheres 2 mg/0.65 mL Pnij Commonly known as:  BYDUREON  
2 mg by SubCUTAneous route every seven (7) days. glimepiride 4 mg tablet Commonly known as:  AMARYL  
TAKE 1 TABLET BY MOUTH TWICE DAILY for sugar  
  
 metFORMIN 1,000 mg tablet Commonly known as:  GLUCOPHAGE  
1.5 tab in AM and 1 in evening for sugar  Indications: sugar  
  
 mometasone 0.1 % ointment Commonly known as:  ELOCON  
AAA to itchy spots qdprn  
  
 pravastatin 10 mg tablet Commonly known as:  PRAVACHOL  
TAKE 1 TABLET BY MOUTH AT BEDTIME FOR CHOLESTEROL and heart  
  
 valsartan 320 mg tablet Commonly known as:  DIOVAN Take 1 Tab by mouth daily. Indications: pressure  
  
 venlafaxine-SR 75 mg capsule Commonly known as:  EFFEXOR-XR Take 1 Cap by mouth daily. Indications: anxiety  
  
 zolpidem 5 mg tablet Commonly known as:  AMBIEN Take 1 Tab by mouth nightly as needed for Sleep. Max Daily Amount: 5 mg. Prescriptions Printed Refills  
 zolpidem (AMBIEN) 5 mg tablet 0 Sig: Take 1 Tab by mouth nightly as needed for Sleep. Max Daily Amount: 5 mg. Class: Print Route: Oral  
  
Prescriptions Sent to Pharmacy Refills  
 venlafaxine-SR (EFFEXOR-XR) 75 mg capsule 3 Sig: Take 1 Cap by mouth daily. Indications: anxiety  Class: Normal  
 [facility] [facility]  [facility] Ph #: [phone] Route: Oral  
 pravastatin (PRAVACHOL) 10 mg tablet 3 Sig: TAKE 1 TABLET BY MOUTH AT BEDTIME FOR CHOLESTEROL and heart Class: Normal  
 Pharmacy: [address]  Ph #: [phone]  
 glimepiride (AMARYL) 4 mg tablet 11 Sig: TAKE 1 TABLET BY MOUTH TWICE DAILY for sugar Class: Normal  
 Pharmacy: [address]  Ph #: [phone]  
 amLODIPine (NORVASC) 10 mg tablet 3 Sig: TAKE 1 TABLET BY MOUTH DAILY for pressure Class: Normal  
 Pharmacy: [address]  Ph #: [phone]  
 metFORMIN (GLUCOPHAGE) 1,000 mg tablet 3 Si.5 tab in AM and 1 in evening for sugar  Indications: sugar Class: Normal  
 Pharmacy: [address]  Ph #: [phone]  
 valsartan (DIOVAN) 320 mg tablet 3 Sig: Take 1 Tab by mouth daily. Indications: pressure Class: Normal  
 Pharmacy: [address]  Ph #: [phone] Route: Oral  
 mometasone (ELOCON) 0.1 % ointment 3 Sig: AAA to itchy spots qdprn Class: Normal  
 Pharmacy: [address]  Ph #: [phone] We Performed the Following AMB POC URINE, MICROALBUMIN, SEMIQUANT (3 RESULTS) [96650 CPT(R)] HEMOGLOBIN A1C WITH EAG [29818 CPT(R)]  DIABETES FOOT EXAM [HM7 Custom] LIPID PANEL [07781 CPT(R)] METABOLIC PANEL, COMPREHENSIVE [52170 CPT(R)] Follow-up Instructions Return in about 3 months (around 2018). Introducing [facility] SERVICES! [address] [facility] introduces [employer] patient portal. Now you can access parts of your medical record, email your doctor's office, and request medication refills online. 1. In your internet browser, go to [email or web address] 2. Click on the First Time User? Click Here link in the Sign In box. You will see the New Member Sign Up page. 3. Enter your [employer] Access Code exactly as it appears below. You will not need to use this code after youve completed the sign-up process. If you do not sign up before the expiration date, you must request a new code. · [employer] Access Code: [email or web address] Expires: 6/27/2018  9:04 AM 
 
4. Enter the last four digits of your Social Security Number (xxxx) and Date of Birth (mm/dd/yyyy) as indicated and click Submit. You will be taken to the next sign-up page. 5. Create a [employer] ID. This will be your [employer] login ID and cannot be changed, so think of one that is secure and easy to remember. 6. Create a [employer] password. You can change your password at any time. 7. Enter your Password Reset Question and Answer. This can be used at a later time if you forget your password. 8. Enter your e-mail address. You will receive e-mail notification when new information is available in [address] 9. Click Sign Up. You can now view and download portions of your medical record. 10. Click the Download Summary menu link to download a portable copy of your medical information. If you have questions, please visit the Frequently Asked Questions section of the [employer] website. Remember, [employer] is NOT to be used for urgent needs. For medical emergencies, dial 911. Now available from your iPhone and Android! Please provide this summary of care documentation to your next provider. Your primary care clinician is listed as Maria Elena Richard. If you have any questions after today's visit, please call [phone].

## 2018-03-29 NOTE — PROGRESS NOTES
Robin Salmon is a 36 y.o. male presenting for/with:    Diabetes (follow up); Skin Problem (rash on both elbows x 1 month); Sleep Problem; and Headache    HPI:  Diabetes. Sugars controlled better since last visit. Hypoglycemia: rare spells to 70's, once or twice soon after starting the bydureon. Tolerating current treatment well. Had some nausea at first, but became more tolerant since. Current medications include metformin (max dose) and bydureon. Lab Results   Component Value Date/Time    Hemoglobin A1c 10.2 (H) 06/27/2017 02:57 PM    Hemoglobin A1c 12.6 (H) 02/28/2017 02:09 PM    Glucose 191 (H) 06/27/2017 02:57 PM    Microalb/Creat ratio (ug/mg creat.) 68.2 (H) 02/28/2017 02:09 PM    LDL, calculated 70 02/28/2017 02:09 PM    Creatinine 0.98 06/27/2017 02:57 PM     Lab Results   Component Value Date/Time    Microalb/Creat ratio (ug/mg creat.) 68.2 (H) 02/28/2017 02:09 PM     Last eye exam performed  >1 year ago and was normal.  Last foot exam performed greather than 1 year ago. Hypertension. Blood pressures have been up a little lately. Management at last visit included continuing current regimen . Current regimen: angiotensin II receptor antagonist and calcium channel blocker. Symptoms include no symptoms. Patient denies chest pain, palpitations, peripheral edema. Lab review:   Lab Results   Component Value Date/Time    Sodium 140 06/27/2017 02:57 PM    Potassium 4.6 06/27/2017 02:57 PM    Chloride 101 06/27/2017 02:57 PM    CO2 20 06/27/2017 02:57 PM    Glucose 191 (H) 06/27/2017 02:57 PM    BUN 13 06/27/2017 02:57 PM    Creatinine 0.98 06/27/2017 02:57 PM    BUN/Creatinine ratio 13 06/27/2017 02:57 PM    GFR est  06/27/2017 02:57 PM    GFR est non-AA 95 06/27/2017 02:57 PM    Calcium 9.8 06/27/2017 02:57 PM     Hyperlipidemia. On pravastatin 10. Kasey well. No myalgias, arthralgias, unusual weakness.   Lab Results   Component Value Date/Time    Cholesterol, total 138 02/28/2017 02:09 PM HDL Cholesterol 35 (L) 02/28/2017 02:09 PM    LDL, calculated 70 02/28/2017 02:09 PM    VLDL, calculated 33 02/28/2017 02:09 PM    Triglyceride 164 (H) 02/28/2017 02:09 PM     Lab Results   Component Value Date/Time    ALT (SGPT) 11 02/28/2017 02:09 PM    AST (SGOT) 12 02/28/2017 02:09 PM    Alk. phosphatase 119 (H) 02/28/2017 02:09 PM    Bilirubin, total 0.4 02/28/2017 02:09 PM     Anxiety  Doing better with effexor than with SSRI, but still having some anxiety sx and sleeplessness. PMH, SH, Medications/Allergies: reviewed, on chart. No Known Allergies    ROS:  Constitutional: No fever, chills or weight loss  Respiratory: No cough, SOB   CV: No chest pain or Palpitations  Skin- resolved jock itch after last visit. Visit Vitals    /90 (BP 1 Location: Left arm, BP Patient Position: Sitting)    Pulse 99    Temp 98.9 °F (37.2 °C) (Temporal)    Resp 16    Ht 5' 9\" (1.753 m)    Wt 172 lb (78 kg)    SpO2 97%    BMI 25.4 kg/m2     Wt Readings from Last 3 Encounters:   03/29/18 172 lb (78 kg)   06/27/17 178 lb 6.4 oz (80.9 kg)   02/28/17 190 lb (86.2 kg)   -6#  BP Readings from Last 3 Encounters:   03/29/18 140/90   06/27/17 122/86   02/28/17 (!) 124/96     Physical Examination: General appearance - alert, well appearing, and in no distress  Mental status - alert, oriented to person, place, and time  Eyes - pupils equal and reactive, extraocular eye movements intact  ENT - bilateral external ears and nose normal. Normal lips  Neck - supple, no significant adenopathy, no thyromegaly or mass  Lymphatics - no palpable lymphadenopathy, no hepatosplenomegaly  Chest - clear to auscultation, no wheezes, rales or rhonchi, symmetric air entry  Heart - normal rate, regular rhythm, normal S1, S2, no murmurs, rubs, clicks or gallops  Extremities - peripheral pulses normal, no pedal edema, no clubbing or cyanosis. Foot check: No calluses, no tinea. DP, PT pulses 2+ bilat.  Monofilament test normal bilat.    A/P:  DM2  Poor control previously. Will see how he's doing on bydureon. Check labs. Has lost another 6#, so anticipate continuing improvement. If still >9, plan change amaryl to something like lantus, 10 units daily. If <9, probably will get to goal with con't wt loss and TLC's, so will stay with max dose metformin and bydureon. HLD  well controlled. con't current tx. Check labs, adjust PRN. HTN  Not in goal. Change losartan to valsartan 320mg daily. con't norvasc at 10mg    Anxiety  Doing well on effexor XR 37.5 QAM.    Psoriasis  Tx with elocon AAA every day. Prevnar 13 today. F/U 3mo.

## 2018-03-30 LAB
ALBUMIN SERPL-MCNC: 4 G/DL (ref 3.5–5.5)
ALBUMIN/GLOB SERPL: 1.7 {RATIO} (ref 1.2–2.2)
ALP SERPL-CCNC: 115 IU/L (ref 39–117)
ALT SERPL-CCNC: 17 IU/L (ref 0–44)
AST SERPL-CCNC: 15 IU/L (ref 0–40)
BILIRUB SERPL-MCNC: 0.3 MG/DL (ref 0–1.2)
BUN SERPL-MCNC: 14 MG/DL (ref 6–24)
BUN/CREAT SERPL: 20 (ref 9–20)
CALCIUM SERPL-MCNC: 9.4 MG/DL (ref 8.7–10.2)
CHLORIDE SERPL-SCNC: 100 MMOL/L (ref 96–106)
CHOLEST SERPL-MCNC: 134 MG/DL (ref 100–199)
CO2 SERPL-SCNC: 21 MMOL/L (ref 18–29)
CREAT SERPL-MCNC: 0.7 MG/DL (ref 0.76–1.27)
EST. AVERAGE GLUCOSE BLD GHB EST-MCNC: 260 MG/DL
GFR SERPLBLD CREATININE-BSD FMLA CKD-EPI: 116 ML/MIN/1.73
GFR SERPLBLD CREATININE-BSD FMLA CKD-EPI: 134 ML/MIN/1.73
GLOBULIN SER CALC-MCNC: 2.4 G/DL (ref 1.5–4.5)
GLUCOSE SERPL-MCNC: 237 MG/DL (ref 65–99)
HBA1C MFR BLD: 10.7 % (ref 4.8–5.6)
HDLC SERPL-MCNC: 41 MG/DL
LDLC SERPL CALC-MCNC: 58 MG/DL (ref 0–99)
POTASSIUM SERPL-SCNC: 4.9 MMOL/L (ref 3.5–5.2)
PROT SERPL-MCNC: 6.4 G/DL (ref 6–8.5)
SODIUM SERPL-SCNC: 139 MMOL/L (ref 134–144)
TRIGL SERPL-MCNC: 176 MG/DL (ref 0–149)
VLDLC SERPL CALC-MCNC: 35 MG/DL (ref 5–40)

## 2018-04-02 RX ORDER — PEN NEEDLE, DIABETIC 30 GX3/16"
NEEDLE, DISPOSABLE MISCELLANEOUS
Qty: 1 PACKAGE | Refills: 11 | Status: SHIPPED | OUTPATIENT
Start: 2018-04-02 | End: 2019-04-15 | Stop reason: SDUPTHER

## 2018-04-02 RX ORDER — INSULIN GLARGINE 100 [IU]/ML
INJECTION, SOLUTION SUBCUTANEOUS
Qty: 1 PEN | Refills: 11 | Status: SHIPPED | OUTPATIENT
Start: 2018-04-02 | End: 2018-06-12

## 2018-04-02 NOTE — PROGRESS NOTES
Sugar still really high. Change amaryl to lantus 10 units daily. Please call to see if needs insulin teaching and give if needed. Rest of labs good.

## 2018-04-16 DIAGNOSIS — F41.9 ANXIETY: ICD-10-CM

## 2018-04-16 RX ORDER — ZOLPIDEM TARTRATE 5 MG/1
5 TABLET ORAL
Qty: 30 TAB | Refills: 0 | Status: SHIPPED | OUTPATIENT
Start: 2018-04-16 | End: 2018-06-25 | Stop reason: SDUPTHER

## 2018-04-16 NOTE — TELEPHONE ENCOUNTER
----- Message from Anmol Oliveros sent at 4/13/2018  1:22 PM EDT -----  Regarding: Dr. Olivia Brumfield pt's wife, is calling for script for \"zolpidem\"  sent to [facility] in [place]. Pt's best contact is [phone].

## 2018-06-22 DIAGNOSIS — F41.9 ANXIETY: ICD-10-CM

## 2018-06-22 NOTE — TELEPHONE ENCOUNTER
----- Message from Douglas Patiño sent at 6/22/2018  9:26 AM EDT -----  Regarding: Dr. Ivania Caldera  Pt requesting refill on Zolpidem 5mg. [facility] pharmacy on file. Stated only have one left.  Best contact [phone] or [phone]

## 2018-06-27 RX ORDER — ZOLPIDEM TARTRATE 5 MG/1
5 TABLET ORAL
Qty: 30 TAB | Refills: 0 | Status: SHIPPED | OUTPATIENT
Start: 2018-06-27 | End: 2019-06-18 | Stop reason: ALTCHOICE

## 2018-08-28 ENCOUNTER — TELEPHONE (OUTPATIENT)
Dept: FAMILY MEDICINE CLINIC | Age: 43
End: 2018-08-28

## 2018-08-28 DIAGNOSIS — I10 ESSENTIAL HYPERTENSION: Primary | ICD-10-CM

## 2018-08-29 RX ORDER — IRBESARTAN 300 MG/1
300 TABLET ORAL DAILY
Qty: 90 TAB | Refills: 1 | Status: SHIPPED | OUTPATIENT
Start: 2018-08-29 | End: 2019-03-13 | Stop reason: RX

## 2019-03-13 DIAGNOSIS — I10 ESSENTIAL HYPERTENSION: ICD-10-CM

## 2019-03-13 RX ORDER — VALSARTAN 320 MG/1
320 TABLET ORAL DAILY
Qty: 90 TAB | Refills: 3 | Status: SHIPPED | OUTPATIENT
Start: 2019-03-13 | End: 2020-04-03

## 2019-03-13 RX ORDER — IRBESARTAN 300 MG/1
TABLET ORAL
Qty: 90 TAB | Refills: 0 | OUTPATIENT
Start: 2019-03-13

## 2019-04-05 DIAGNOSIS — F41.9 ANXIETY: ICD-10-CM

## 2019-04-05 DIAGNOSIS — E11.9 TYPE 2 DIABETES MELLITUS WITHOUT COMPLICATION, WITHOUT LONG-TERM CURRENT USE OF INSULIN (HCC): ICD-10-CM

## 2019-04-06 RX ORDER — VENLAFAXINE HYDROCHLORIDE 75 MG/1
CAPSULE, EXTENDED RELEASE ORAL
Qty: 90 CAP | Refills: 0 | Status: SHIPPED | OUTPATIENT
Start: 2019-04-06 | End: 2019-07-08 | Stop reason: SDUPTHER

## 2019-04-06 RX ORDER — METFORMIN HYDROCHLORIDE 1000 MG/1
TABLET ORAL
Qty: 225 TAB | Refills: 0 | Status: SHIPPED | OUTPATIENT
Start: 2019-04-06 | End: 2019-07-24 | Stop reason: SDUPTHER

## 2019-04-08 PROBLEM — F51.04 PSYCHOPHYSIOLOGICAL INSOMNIA: Status: ACTIVE | Noted: 2019-04-08

## 2019-04-08 PROBLEM — M54.50 LOW BACK PAIN: Status: ACTIVE | Noted: 2019-04-08

## 2019-04-16 DIAGNOSIS — E11.9 TYPE 2 DIABETES MELLITUS WITHOUT COMPLICATION, WITHOUT LONG-TERM CURRENT USE OF INSULIN (HCC): ICD-10-CM

## 2019-04-16 RX ORDER — GLIMEPIRIDE 4 MG/1
TABLET ORAL
Qty: 60 TAB | Refills: 2 | Status: SHIPPED | OUTPATIENT
Start: 2019-04-16 | End: 2019-06-18 | Stop reason: ALTCHOICE

## 2019-05-13 DIAGNOSIS — E78.00 PURE HYPERCHOLESTEROLEMIA: ICD-10-CM

## 2019-05-13 RX ORDER — PRAVASTATIN SODIUM 10 MG/1
TABLET ORAL
Qty: 90 TAB | Refills: 0 | Status: SHIPPED | OUTPATIENT
Start: 2019-05-13 | End: 2019-08-18 | Stop reason: SDUPTHER

## 2019-07-08 DIAGNOSIS — F41.9 ANXIETY: ICD-10-CM

## 2019-07-08 RX ORDER — VENLAFAXINE HYDROCHLORIDE 75 MG/1
CAPSULE, EXTENDED RELEASE ORAL
Qty: 90 CAP | Refills: 0 | Status: SHIPPED | OUTPATIENT
Start: 2019-07-08 | End: 2019-10-04 | Stop reason: SDUPTHER

## 2019-08-18 DIAGNOSIS — E78.00 PURE HYPERCHOLESTEROLEMIA: ICD-10-CM

## 2019-08-18 RX ORDER — PRAVASTATIN SODIUM 10 MG/1
TABLET ORAL
Qty: 90 TAB | Refills: 0 | Status: SHIPPED | OUTPATIENT
Start: 2019-08-18 | End: 2019-11-15 | Stop reason: SDUPTHER

## 2019-10-04 DIAGNOSIS — F41.9 ANXIETY: ICD-10-CM

## 2019-10-07 RX ORDER — VENLAFAXINE HYDROCHLORIDE 75 MG/1
CAPSULE, EXTENDED RELEASE ORAL
Qty: 90 CAP | Refills: 0 | Status: SHIPPED | OUTPATIENT
Start: 2019-10-07 | End: 2019-10-07 | Stop reason: ALTCHOICE

## 2019-11-15 DIAGNOSIS — E78.00 PURE HYPERCHOLESTEROLEMIA: ICD-10-CM

## 2019-11-17 RX ORDER — PRAVASTATIN SODIUM 10 MG/1
TABLET ORAL
Qty: 90 TAB | Refills: 0 | Status: SHIPPED | OUTPATIENT
Start: 2019-11-17 | End: 2020-03-02

## 2020-03-02 DIAGNOSIS — E78.00 PURE HYPERCHOLESTEROLEMIA: ICD-10-CM

## 2020-03-02 RX ORDER — PRAVASTATIN SODIUM 10 MG/1
TABLET ORAL
Qty: 90 TAB | Refills: 0 | Status: SHIPPED | OUTPATIENT
Start: 2020-03-02 | End: 2020-03-07

## 2020-03-07 DIAGNOSIS — E78.00 PURE HYPERCHOLESTEROLEMIA: ICD-10-CM

## 2020-03-07 RX ORDER — PRAVASTATIN SODIUM 10 MG/1
TABLET ORAL
Qty: 90 TAB | Refills: 0 | Status: SHIPPED | OUTPATIENT
Start: 2020-03-07 | End: 2020-06-19 | Stop reason: SDUPTHER

## 2020-03-09 RX ORDER — GLIMEPIRIDE 4 MG/1
TABLET ORAL
Qty: 60 TAB | Refills: 0 | Status: SHIPPED | OUTPATIENT
Start: 2020-03-09 | End: 2020-04-10

## 2020-04-27 RX ORDER — VENLAFAXINE HYDROCHLORIDE 75 MG/1
CAPSULE, EXTENDED RELEASE ORAL
Qty: 90 CAP | Refills: 1 | Status: SHIPPED | OUTPATIENT
Start: 2020-04-27 | End: 2020-11-23 | Stop reason: SDUPTHER

## 2021-03-08 ENCOUNTER — TELEPHONE (OUTPATIENT)
Dept: FAMILY MEDICINE CLINIC | Age: 46
End: 2021-03-08

## 2021-03-08 NOTE — TELEPHONE ENCOUNTER
----- Message from Baldemar Estrada sent at 3/8/2021  4:56 PM EST -----  Regarding: Dr. Aziza Chacon Patient return call    Caller's first and last name and relationship (if not the patient): Jonathan Norris (wife)      Best contact number(s): [phone]      Whose call is being returned: Returning missed call from practice regarding CT results, inquiring bulging disc.       Details to clarify the request:      Baldemar Estrada
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
## 2021-03-09 NOTE — TELEPHONE ENCOUNTER
Wife has been advised per Missy Hagen . Office info for Dr Cristina Redmond given. Patient and wife will call to set up appointment.
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
## 2021-04-02 ENCOUNTER — OFFICE VISIT (OUTPATIENT)
Dept: FAMILY MEDICINE CLINIC | Age: 46
End: 2021-04-02
Payer: COMMERCIAL

## 2021-04-02 VITALS
HEIGHT: 71 IN | TEMPERATURE: 97.3 F | WEIGHT: 150.8 LBS | BODY MASS INDEX: 21.11 KG/M2 | DIASTOLIC BLOOD PRESSURE: 70 MMHG | RESPIRATION RATE: 12 BRPM | HEART RATE: 101 BPM | OXYGEN SATURATION: 99 % | SYSTOLIC BLOOD PRESSURE: 120 MMHG

## 2021-04-02 DIAGNOSIS — L98.9 SKIN LESION: ICD-10-CM

## 2021-04-02 DIAGNOSIS — R53.83 OTHER FATIGUE: ICD-10-CM

## 2021-04-02 DIAGNOSIS — E11.9 TYPE 2 DIABETES MELLITUS WITHOUT COMPLICATION, WITHOUT LONG-TERM CURRENT USE OF INSULIN (HCC): Primary | ICD-10-CM

## 2021-04-02 DIAGNOSIS — E78.00 PURE HYPERCHOLESTEROLEMIA: ICD-10-CM

## 2021-04-02 DIAGNOSIS — F51.04 PSYCHOPHYSIOLOGICAL INSOMNIA: ICD-10-CM

## 2021-04-02 DIAGNOSIS — E55.9 VITAMIN D DEFICIENCY: ICD-10-CM

## 2021-04-02 DIAGNOSIS — Z11.59 ENCOUNTER FOR HEPATITIS C SCREENING TEST FOR LOW RISK PATIENT: ICD-10-CM

## 2021-04-02 DIAGNOSIS — K04.7 TOOTH ABSCESS: ICD-10-CM

## 2021-04-02 DIAGNOSIS — M54.41 ACUTE RIGHT-SIDED LOW BACK PAIN WITH RIGHT-SIDED SCIATICA: ICD-10-CM

## 2021-04-02 DIAGNOSIS — I10 ESSENTIAL HYPERTENSION: ICD-10-CM

## 2021-04-02 PROCEDURE — 36415 COLL VENOUS BLD VENIPUNCTURE: CPT | Performed by: INTERNAL MEDICINE

## 2021-04-02 PROCEDURE — 99214 OFFICE O/P EST MOD 30 MIN: CPT | Performed by: INTERNAL MEDICINE

## 2021-04-02 RX ORDER — AMOXICILLIN AND CLAVULANATE POTASSIUM 875; 125 MG/1; MG/1
1 TABLET, FILM COATED ORAL 2 TIMES DAILY
Qty: 20 TAB | Refills: 0 | Status: SHIPPED | OUTPATIENT
Start: 2021-04-02 | End: 2021-04-12

## 2021-04-02 RX ORDER — MUPIROCIN 20 MG/G
OINTMENT TOPICAL DAILY
Qty: 22 G | Refills: 0 | Status: SHIPPED | OUTPATIENT
Start: 2021-04-02 | End: 2021-12-03

## 2021-04-02 NOTE — PROGRESS NOTES
Jayshree Mendenhall is a 54 y.o. male who presents to the office today with the following:  Chief Complaint   Patient presents with    Hand Injury     Right.  Dental Problem     Right hand - cyst that drained purulent material when he opened it with a sterile needle and pushed on it. Thinks that he may have had a wound to the spot and has been probing the wound to make sure that no foreign material has been left in the wound. No erythema or streaking from the site. Small hard induration with no fluctuance. ? Orrie Plant - has had before, but wouldn't expect drainage    Tooth abscess lower back molar on right-has not yet been able to take care of this and has tooth abscess in the same area previously. Has noted some enlargement of the jaw and tenderness/pain in the area of the tooth, endorses abnormal taste in his mouth. Denies fevers, sweats, chills, nausea, vomiting. fatigue-endorses fatigue that has been worsening. Seems to be with him all day long and does not seem to be worse at any point of the day. May not of been sleeping as well as he normally does due to the fact that his mother was recently diagnosed with breast cancer and his wife and his mother recently had Covid. Feels like he wears out quickly when at work. Weight loss has been noted. No Known Allergies    Current Outpatient Medications   Medication Sig    mupirocin (BACTROBAN) 2 % ointment Apply  to affected area daily. Place on lesion on right hand for MRSA coverage    glimepiride (AMARYL) 4 mg tablet TAKE 1 TABLET BY MOUTH TWICE DAILY FOR SUGAR    baclofen (LIORESAL) 10 mg tablet Take 1 Tab by mouth three (3) times daily. Indications: muscle spasms caused by a spinal disease    ketorolac (TORADOL) 10 mg tablet Take 1 Tab by mouth every six to eight (6-8) hours as needed for Pain. Indications: excessive pain    acetaminophen (TYLENOL) 500 mg tablet Take 2 Tabs by mouth every eight to twelve (8-12) hours as needed for Pain.  Indications: pain    lidocaine (LIDODERM) 5 % Apply patch to the affected area for 12 hours a day and remove for 12 hours a day.  tiZANidine (ZANAFLEX) 2 mg tablet Take 1-2 Tabs by mouth two (2) times daily as needed for Muscle Spasm(s) (take with meal). Indications: muscle spasm    chlorthalidone (HYGROTON) 25 mg tablet TAKE 1/2 TABLET BY MOUTH DAILY    pravastatin (PRAVACHOL) 10 mg tablet TAKE 1 TABLET BY MOUTH EVERY NIGHT    venlafaxine-SR (EFFEXOR-XR) 75 mg capsule TAKE 1 CAPSULE BY MOUTH EVERY DAY    telmisartan (MICARDIS) 80 mg tablet TAKE 1 TABLET BY MOUTH DAILY    alcohol swabs padm USE PRIOR TO USING LANCET DEVICE FOR BLOOD SUGAR CHECKS 1 TO 2 TIMES DAILY    cetirizine (ZYRTEC) 10 mg tablet TK 1 T PO QD    amLODIPine (NORVASC) 10 mg tablet TAKE 1 TABLET BY MOUTH DAILY for pressure    aspirin 81 mg chewable tablet Take 1 Tab by mouth daily. Indications: heart    exenatide microspheres (Bydureon) 2 mg/0.65 mL pnij INJECT 0.65ML UNDER THE SKIN EVERY 7 DAYS    metFORMIN (GLUCOPHAGE) 1,000 mg tablet TAKE 1& 1/2 TABLETS IN THE MORNING AND TAKE 1 TABLET BY MOUTH EVERY EVENING FOR SUGAR    traZODone (DESYREL) 50 mg tablet Take 1 Tab by mouth nightly.  Insulin Needles, Disposable, (BD Ultra-Fine Short Pen Needle) 31 gauge x 5/16\" ndle USE TO INJECT INSULIN DAILY Dx:  E11.9    triamcinolone acetonide (KENALOG) 0.5 % ointment Apply  to affected area two (2) times a day. use thin layer    flash glucose sensor (FREESTYLE STEVE 14 DAY SENSOR) kit Used to check blood glucose QID and PRN. Dx E11.9    flash glucose scanning reader (FREESTYLE STEVE 14 DAY READER) [facility] Use to check blood glucose QID and PRN. Dx E11.9    glucose blood VI test strips (ASCENSIA AUTODISC VI, ONE TOUCH ULTRA TEST VI) strip Test blood sugars 1-2 times per day: Diagnosis diabetes mellitus type 2    Blood-Glucose Meter (BLOOD GLUCOSE MONITORING) monitoring kit To test blood sugars 1-2 times daily.   Diagnosis: Diabetes mellitus type 2.    lancets misc To check blood sugars 1-2 times daily. Diagnosis: Diabetes mellitus type 2.    calcipotriene-betamethasone (TACLONEX) 0.005-0.064 % topical ointment Apply  to affected area daily. Up to two weeks at a time    flash glucose scanning reader (FreeStyle [place] 2 [place]) misc As directed    flash glucose sensor (FreeStyle Maddi 2 Sensor) kit As directed    insulin glargine (LANTUS,BASAGLAR) 100 unit/mL (3 mL) inpn 25 Units by SubCUTAneous route daily. Indications: type 2 diabetes mellitus     No current facility-administered medications for this visit. Past Medical History:   Diagnosis Date    Anxiety     Diabetes ([facility]     HPV in male     Hypertension     Insomnia     Sinus problem     Stress        No past surgical history on file.     Social History     Socioeconomic History    Marital status:      Spouse name: Not on file    Number of children: Not on file    Years of education: Not on file    Highest education level: Not on file   Tobacco Use    Smoking status: Current Every Day Smoker     Packs/day: 1.00     Years: 25.00     Pack years: 25.00    Smokeless tobacco: Never Used   Substance and Sexual Activity    Alcohol use: No    Drug use: No    Sexual activity: Yes       Social History     Tobacco Use   Smoking Status Current Every Day Smoker    Packs/day: 1.00    Years: 25.00    Pack years: 25.00   Smokeless Tobacco Never Used       Family History   Problem Relation Age of Onset    Heart Disease Father     Suicide Brother        Vitals:    04/02/21 0925   BP: 120/70   BP 1 Location: Left upper arm   BP Patient Position: Sitting   BP Cuff Size: Adult   Pulse: (!) 101   Resp: 12   Temp: 97.3 °F (36.3 °C)   TempSrc: Core   SpO2: 99%   Weight: 150 lb 12.8 oz (68.4 kg)   Height: 5' 11\" (1.803 m)         3 most recent PHQ Screens 4/28/2021   Little interest or pleasure in doing things Not at all   Feeling down, depressed, irritable, or hopeless Not at all   Total Score PHQ 2 0       ROS:  Denies fever, chills, cough, chest pain or pressure, SOB/CHRIS,  nausea, vomiting, or diarrhea/constipation. No changes in vision or hearing. No new or worsening headaches. No edema, no claudication. Denies wt loss, wt gain, hemoptysis, hematochezia or melena. No dysuria, pyuria, frequency. No polydipsia, polyuria. No new weakness, arthralgias, myalgias. No weakness, dizziness, lightheadedness, numbness or tingling. No recent changes in moods. Physical Examination:    GENERAL APPEARANCE: NAD, appears stated age, comfortable  VITAL SIGNS:   Visit Vitals  /70 (BP 1 Location: Left upper arm, BP Patient Position: Sitting, BP Cuff Size: Adult)   Pulse (!) 101   Temp 97.3 °F (36.3 °C) (Core)   Resp 12   Ht 5' 11\" (1.803 m)   Wt 150 lb 12.8 oz (68.4 kg)   SpO2 99%   BMI 21.03 kg/m²     HEENT: Normocephalic and atraumatic. No scleral icterus. Pupils are equal, round, and reactive to light and accommodation. No conjunctival injection is noted. Tympanic membranes are clear. NECK: Supple. No thyroid nodularity or enlargement. No lymphadenopathy or tenderness. LUNGS: Breath sounds are equal and clear bilaterally. No wheezes, rhonchi, or rales. HEART: Tachycardic with regular rate and rhythm with normal S1 and S2. No murmurs, gallops, or rubs. ABDOMEN: Soft  EXTREMITIES: No cyanosis, clubbing, or edema. No calf pain to palpation. MUSCULOSKELETAL:  NEUROLOGIC: CN II-XII intact; no focal neurological deficits. PSYCHIATRIC: The patient is awake, alert, and oriented x3. Recent and remote memory is intact. Appropriate mood and affect. SKIN: Warm, dry, and well perfused. Good turgor. No erythema or streaking from the area of the lesion on the thenar eminence. Small hard induration with no fluctuance. ASSESSMENT AND PLAN:    Skin lesion   Continues monitor the lesion, can use mupirocin ointment and warm compresses in the area.   Should he notice increasing erythema, drainage, or enlargement of the area, return to clinic. Would reassess at that point in time for I&D and further work-up. Other fatigue  We will check lab work for [facility] as he is a nondiabetic, A1c to assess his diabetes control, CBC to assess for anemia which can all lead to fatigue. He does have noted weight loss so concern would be for either worsening of the diabetes, or another underlying inflammatory process. Screenings are up-to-date. Tooth abscess   Treat with Augmentin twice daily for 10 days. Follow-up with dentistry. Can use Tylenol/ibuprofen for pain as well as warm or cold compresses in the area. If he should start to notice any systemic signs or symptoms of worsening, would see if can get in urgently to the dentist or go to the ER.         Orders Placed This Encounter    CBC WITH AUTOMATED DIFF     Standing Status:   Future     Number of Occurrences:   1     Standing Expiration Date:   3/9/0499    METABOLIC PANEL, COMPREHENSIVE     Standing Status:   Future     Number of Occurrences:   1     Standing Expiration Date:   4/2/2022    LIPID PANEL     Standing Status:   Future     Number of Occurrences:   1     Standing Expiration Date:   4/2/2022    HEMOGLOBIN A1C WITH EAG     Standing Status:   Future     Number of Occurrences:   1     Standing Expiration Date:   4/2/2022    VITAMIN D, 25 HYDROXY     Standing Status:   Future     Number of Occurrences:   1     Standing Expiration Date:   4/2/2022    TSH 3RD GENERATION     Standing Status:   Future     Number of Occurrences:   1     Standing Expiration Date:   4/2/2022    T4, FREE     Standing Status:   Future     Number of Occurrences:   1     Standing Expiration Date:   4/2/2022    PROTEIN ELECTROPHORESIS     Standing Status:   Future     Number of Occurrences:   1     Standing Expiration Date:   4/2/2022    IMMUNOELECTROPHORESIS [facility]     Standing Status:   Future     Number of Occurrences:   1     Standing Expiration Date:   4/2/2022   Shailesh Resendez GLUTAMIC ACID DECARB AB     Standing Status:   Future     Number of Occurrences:   1     Standing Expiration Date:   4/2/2022    HEPATITIS C AB     Standing Status:   Future     Number of Occurrences:   1     Standing Expiration Date:   4/2/2022    COLLECTION VENOUS BLOOD,VENIPUNCTURE     Standing Status:   Future     Number of Occurrences:   1     Standing Expiration Date:   10/2/2021    amoxicillin-clavulanate (AUGMENTIN) 875-125 mg per tablet     Sig: Take 1 Tab by mouth two (2) times a day for 10 days. Indications: an infection of the skin and the tissue below the skin, tooth abscess     Dispense:  20 Tab     Refill:  0    mupirocin (BACTROBAN) 2 % ointment     Sig: Apply  to affected area daily. Place on lesion on right hand for MRSA coverage     Dispense:  22 g     Refill:  0       Patient to return PRN for any new symptoms, call/come to clinic if notices any side effects from medication or any new side effects, and return for regularly scheduled visit dependent upon lab results. Patient verbalized understanding of and agreement to treatment plan. Patient has been advised to contact practice or seek care if condition persists or worsens. Aixa Galvez, DO      This documentation was facilitated by voice recognition software and may contain inadvertent typographical errors. Please note that this dictation was completed with [employer], the computer voice recognition software. Quite often unanticipated grammatical, syntax, homophones, and other interpretive errors are inadvertently transcribed by the computer software. Please disregard these errors. Please excuse any errors that have escaped final proofreading.

## 2021-04-02 NOTE — PATIENT INSTRUCTIONS
Fatigue: Care Instructions Your Care Instructions Fatigue is a feeling of tiredness, exhaustion, or lack of energy. You may feel fatigue because of too much or not enough activity. It can also come from stress, lack of sleep, boredom, and poor diet. Many medical problems, such as viral infections, can cause fatigue. Emotional problems, especially depression, are often the cause of fatigue. Fatigue is most often a symptom of another problem. Treatment for fatigue depends on the cause. For example, if you have fatigue because you have a certain health problem, treating this problem also treats your fatigue. If depression or anxiety is the cause, treatment may help. Follow-up care is a key part of your treatment and safety. Be sure to make and go to all appointments, and call your doctor if you are having problems. It's also a good idea to know your test results and keep a list of the medicines you take. How can you care for yourself at home? · Get regular exercise. But don't overdo it. Go back and forth between rest and exercise. · Get plenty of rest. 
· Eat a healthy diet. Do not skip meals, especially breakfast. 
· Reduce your use of caffeine, tobacco, and alcohol. Caffeine is most often found in coffee, tea, cola drinks, and chocolate. · Limit medicines that can cause fatigue. This includes tranquilizers and cold and allergy medicines. When should you call for help? Watch closely for changes in your health, and be sure to contact your doctor if: 
  · You have new symptoms such as fever or a rash.  
  · Your fatigue gets worse.  
  · You have been feeling down, depressed, or hopeless. Or you may have lost interest in things that you usually enjoy.  
  · You are not getting better as expected. Where can you learn more? Go to http://www.gray.com/ Enter A585 in the search box to learn more about \"Fatigue: Care Instructions. \" Current as of: February 26, 2020               Content Version: 12.8 © 4799-8662 [employer]. Care instructions adapted under license by [employer] (which disclaims liability or warranty for this information). If you have questions about a medical condition or this instruction, always ask your healthcare professional. [address] any warranty or liability for your use of this information.

## 2021-04-02 NOTE — PROGRESS NOTES
1. Have you been to the ER, urgent care clinic since your last visit? Hospitalized since your last visit? No    2. Have you seen or consulted any other health care providers outside of the [address] [place] [address] since your last visit? Include any pap smears or colon screening.  No       Learning Assessment 4/2/2021   PRIMARY LEARNER Patient   HIGHEST LEVEL OF EDUCATION - PRIMARY LEARNER  SOME COLLEGE   BARRIERS PRIMARY LEARNER NONE   CO-LEARNER CAREGIVER No   PRIMARY LANGUAGE ENGLISH   LEARNER PREFERENCE PRIMARY READING   ANSWERED BY Patient    RELATIONSHIP SELF

## 2021-04-04 LAB
25(OH)D3 SERPL-MCNC: 18.3 NG/ML (ref 30–100)
ALBUMIN SERPL-MCNC: 3.7 G/DL (ref 3.5–5)
ALBUMIN/GLOB SERPL: 1.3 {RATIO} (ref 1.1–2.2)
ALP SERPL-CCNC: 197 U/L (ref 45–117)
ALT SERPL-CCNC: 20 U/L (ref 12–78)
ANION GAP SERPL CALC-SCNC: 8 MMOL/L (ref 5–15)
AST SERPL-CCNC: 8 U/L (ref 15–37)
BASOPHILS # BLD: 0 K/UL (ref 0–0.1)
BASOPHILS NFR BLD: 0 % (ref 0–1)
BILIRUB SERPL-MCNC: 0.3 MG/DL (ref 0.2–1)
BUN SERPL-MCNC: 16 MG/DL (ref 6–20)
BUN/CREAT SERPL: 15 (ref 12–20)
CALCIUM SERPL-MCNC: 8.8 MG/DL (ref 8.5–10.1)
CHLORIDE SERPL-SCNC: 97 MMOL/L (ref 97–108)
CHOLEST SERPL-MCNC: 203 MG/DL
CO2 SERPL-SCNC: 26 MMOL/L (ref 21–32)
CREAT SERPL-MCNC: 1.04 MG/DL (ref 0.7–1.3)
DIFFERENTIAL METHOD BLD: NORMAL
EOSINOPHIL # BLD: 0.4 K/UL (ref 0–0.4)
EOSINOPHIL NFR BLD: 4 % (ref 0–7)
ERYTHROCYTE [DISTWIDTH] IN BLOOD BY AUTOMATED COUNT: 12.9 % (ref 11.5–14.5)
EST. AVERAGE GLUCOSE BLD GHB EST-MCNC: ABNORMAL MG/DL
GLOBULIN SER CALC-MCNC: 2.9 G/DL (ref 2–4)
GLUCOSE SERPL-MCNC: 586 MG/DL (ref 65–100)
HBA1C MFR BLD: >14 % (ref 4–5.6)
HCT VFR BLD AUTO: 47.6 % (ref 36.6–50.3)
HCV AB SERPL QL IA: NONREACTIVE
HCV COMMENT,HCGAC: NORMAL
HDLC SERPL-MCNC: 48 MG/DL
HDLC SERPL: 4.2 {RATIO} (ref 0–5)
HGB BLD-MCNC: 15.5 G/DL (ref 12.1–17)
IMM GRANULOCYTES # BLD AUTO: 0 K/UL
IMM GRANULOCYTES NFR BLD AUTO: 0 %
LDLC SERPL CALC-MCNC: 114 MG/DL (ref 0–100)
LIPID PROFILE,FLP: ABNORMAL
LYMPHOCYTES # BLD: 2.1 K/UL (ref 0.8–3.5)
LYMPHOCYTES NFR BLD: 22 % (ref 12–49)
MCH RBC QN AUTO: 29.6 PG (ref 26–34)
MCHC RBC AUTO-ENTMCNC: 32.6 G/DL (ref 30–36.5)
MCV RBC AUTO: 90.8 FL (ref 80–99)
MONOCYTES # BLD: 0.6 K/UL (ref 0–1)
MONOCYTES NFR BLD: 6 % (ref 5–13)
NEUTS SEG # BLD: 6.4 K/UL (ref 1.8–8)
NEUTS SEG NFR BLD: 68 % (ref 32–75)
NRBC # BLD: 0 K/UL (ref 0–0.01)
NRBC BLD-RTO: 0 PER 100 WBC
PLATELET # BLD AUTO: 311 K/UL (ref 150–400)
PMV BLD AUTO: 9.9 FL (ref 8.9–12.9)
POTASSIUM SERPL-SCNC: 4.2 MMOL/L (ref 3.5–5.1)
PROT SERPL-MCNC: 6.6 G/DL (ref 6.4–8.2)
RBC # BLD AUTO: 5.24 M/UL (ref 4.1–5.7)
RBC MORPH BLD: NORMAL
SODIUM SERPL-SCNC: 131 MMOL/L (ref 136–145)
T4 FREE SERPL-MCNC: 1.1 NG/DL (ref 0.8–1.5)
TRIGL SERPL-MCNC: 205 MG/DL (ref ?–150)
TSH SERPL DL<=0.05 MIU/L-ACNC: 3.81 UIU/ML (ref 0.36–3.74)
VLDLC SERPL CALC-MCNC: 41 MG/DL
WBC # BLD AUTO: 9.5 K/UL (ref 4.1–11.1)

## 2021-04-05 LAB
ALBUMIN SERPL ELPH-MCNC: 3.4 G/DL (ref 2.9–4.4)
ALBUMIN/GLOB SERPL: 1.4 {RATIO} (ref 0.7–1.7)
ALPHA1 GLOB SERPL ELPH-MCNC: 0.2 G/DL (ref 0–0.4)
ALPHA2 GLOB SERPL ELPH-MCNC: 0.8 G/DL (ref 0.4–1)
B-GLOBULIN SERPL ELPH-MCNC: 0.9 G/DL (ref 0.7–1.3)
GAMMA GLOB SERPL ELPH-MCNC: 0.6 G/DL (ref 0.4–1.8)
GLOBULIN SER CALC-MCNC: 2.5 G/DL (ref 2.2–3.9)
M PROTEIN SERPL ELPH-MCNC: ABNORMAL G/DL
PROT SERPL-MCNC: 5.9 G/DL (ref 6–8.5)

## 2021-04-06 LAB
IGA SERPL-MCNC: 97 MG/DL (ref 90–386)
IGG SERPL-MCNC: 667 MG/DL (ref 603–1613)
IGM SERPL-MCNC: 70 MG/DL (ref 20–172)
PROT PATTERN SERPL IFE-IMP: NORMAL

## 2021-04-07 LAB — GAD65 AB SER-ACNC: 1167.2 U/ML (ref 0–5)

## 2021-04-16 ENCOUNTER — TRANSCRIBE ORDER (OUTPATIENT)
Dept: SCHEDULING | Age: 46
End: 2021-04-16

## 2021-04-16 DIAGNOSIS — M51.26 LUMBAR HERNIATED DISC: Primary | ICD-10-CM

## 2021-04-23 ENCOUNTER — OFFICE VISIT (OUTPATIENT)
Dept: FAMILY MEDICINE CLINIC | Age: 46
End: 2021-04-23

## 2021-04-23 DIAGNOSIS — Z91.198 FAILURE TO ATTEND APPOINTMENT WITH REASON GIVEN: Primary | ICD-10-CM

## 2021-04-28 ENCOUNTER — OFFICE VISIT (OUTPATIENT)
Dept: FAMILY MEDICINE CLINIC | Age: 46
End: 2021-04-28
Payer: COMMERCIAL

## 2021-04-28 VITALS
HEIGHT: 71 IN | DIASTOLIC BLOOD PRESSURE: 90 MMHG | BODY MASS INDEX: 21.48 KG/M2 | SYSTOLIC BLOOD PRESSURE: 122 MMHG | HEART RATE: 80 BPM | RESPIRATION RATE: 16 BRPM | TEMPERATURE: 97.3 F | WEIGHT: 153.4 LBS | OXYGEN SATURATION: 99 %

## 2021-04-28 DIAGNOSIS — E13.9 LADA (LATENT AUTOIMMUNE DIABETES IN ADULTS), MANAGED AS TYPE 1 (HCC): ICD-10-CM

## 2021-04-28 PROCEDURE — 99213 OFFICE O/P EST LOW 20 MIN: CPT | Performed by: INTERNAL MEDICINE

## 2021-04-28 RX ORDER — FLASH GLUCOSE SENSOR
KIT MISCELLANEOUS
Qty: 6 KIT | Refills: 5 | Status: SHIPPED | OUTPATIENT
Start: 2021-04-28 | End: 2021-12-03 | Stop reason: SDUPTHER

## 2021-04-28 RX ORDER — INSULIN GLARGINE 100 [IU]/ML
25 INJECTION, SOLUTION SUBCUTANEOUS DAILY
Qty: 6 PEN | Refills: 5 | Status: SHIPPED | OUTPATIENT
Start: 2021-04-28 | End: 2021-06-22 | Stop reason: SDUPTHER

## 2021-04-28 RX ORDER — FLASH GLUCOSE SCANNING READER
EACH MISCELLANEOUS
Qty: 1 EACH | Refills: 1 | Status: SHIPPED | OUTPATIENT
Start: 2021-04-28 | End: 2021-12-03 | Stop reason: SDUPTHER

## 2021-04-28 NOTE — PROGRESS NOTES
Raquel Fu is a 54 y.o. male who presents to the office today with the following:  Chief Complaint   Patient presents with    Diabetes    Results     Patient with a history of diabetes that has been nonresponsive to oral antihyperglycemic's. Over the past 5 years his hemoglobin A1c is consistently been above 10. Prior to this visit, he had been on Amaryl 4 mg twice daily, exenatide 0.65 mL every 7 days, Metformin 1500 mg in the morning and 1000 mg in the evening, and glargine 25 to 30 units at night. However, his A1c went from 12 to greater than 14 despite these interventions. Glucose on 4/2 was 586. Cinthia 65 was checked and was 1167.2 units/mL. Given his constellation of issues with the cinthia 65, he may be more of a type I diabetic than type II and we will switch to pure insulin for his management. Suspect latent autoimmune diabetes of the adult     No Known Allergies    Current Outpatient Medications   Medication Sig    flash glucose scanning reader (FreeStyle Maddi 2 [place]) misc As directed    flash glucose sensor (FreeStyle Maddi 2 Sensor) kit As directed    insulin glargine (LANTUS,BASAGLAR) 100 unit/mL (3 mL) inpn 25 Units by SubCUTAneous route daily. Indications: type 2 diabetes mellitus    mupirocin (BACTROBAN) 2 % ointment Apply  to affected area daily. Place on lesion on right hand for MRSA coverage    baclofen (LIORESAL) 10 mg tablet Take 1 Tab by mouth three (3) times daily. Indications: muscle spasms caused by a spinal disease    ketorolac (TORADOL) 10 mg tablet Take 1 Tab by mouth every six to eight (6-8) hours as needed for Pain. Indications: excessive pain    acetaminophen (TYLENOL) 500 mg tablet Take 2 Tabs by mouth every eight to twelve (8-12) hours as needed for Pain. Indications: pain    lidocaine (LIDODERM) 5 % Apply patch to the affected area for 12 hours a day and remove for 12 hours a day.     tiZANidine (ZANAFLEX) 2 mg tablet Take 1-2 Tabs by mouth two (2) times daily as needed for Muscle Spasm(s) (take with meal). Indications: muscle spasm    chlorthalidone (HYGROTON) 25 mg tablet TAKE 1/2 TABLET BY MOUTH DAILY    pravastatin (PRAVACHOL) 10 mg tablet TAKE 1 TABLET BY MOUTH EVERY NIGHT    venlafaxine-SR (EFFEXOR-XR) 75 mg capsule TAKE 1 CAPSULE BY MOUTH EVERY DAY    telmisartan (MICARDIS) 80 mg tablet TAKE 1 TABLET BY MOUTH DAILY    alcohol swabs padm USE PRIOR TO USING LANCET DEVICE FOR BLOOD SUGAR CHECKS 1 TO 2 TIMES DAILY    cetirizine (ZYRTEC) 10 mg tablet TK 1 T PO QD    amLODIPine (NORVASC) 10 mg tablet TAKE 1 TABLET BY MOUTH DAILY for pressure    aspirin 81 mg chewable tablet Take 1 Tab by mouth daily. Indications: heart    traZODone (DESYREL) 50 mg tablet Take 1 Tab by mouth nightly.  Insulin Needles, Disposable, (BD Ultra-Fine Short Pen Needle) 31 gauge x 5/16\" ndle USE TO INJECT INSULIN DAILY Dx:  E11.9    triamcinolone acetonide (KENALOG) 0.5 % ointment Apply  to affected area two (2) times a day. use thin layer    flash glucose sensor (FREESTYLE STEVE 14 DAY SENSOR) kit Used to check blood glucose QID and PRN. Dx E11.9    flash glucose scanning reader (FREESTYLE STEVE 14 DAY READER) misc Use to check blood glucose QID and PRN. Dx E11.9    glucose blood VI test strips (ASCENSIA AUTODISC VI, ONE TOUCH ULTRA TEST VI) strip Test blood sugars 1-2 times per day: Diagnosis diabetes mellitus type 2    Blood-Glucose Meter (BLOOD GLUCOSE MONITORING) monitoring kit To test blood sugars 1-2 times daily. Diagnosis: Diabetes mellitus type 2.    lancets misc To check blood sugars 1-2 times daily. Diagnosis: Diabetes mellitus type 2.    calcipotriene-betamethasone (TACLONEX) 0.005-0.064 % topical ointment Apply  to affected area daily. Up to two weeks at a time     No current facility-administered medications for this visit.         Past Medical History:   Diagnosis Date    Anxiety     Diabetes ([address]     HPV in male     Hypertension     Insomnia     Sinus problem     Stress     Type 2 diabetes mellitus without complication, without long-term current use of insulin ([facility] 2/28/2017    Type 2 diabetes with nephropathy ([facility] 3/29/2018       No past surgical history on file. Social History     Socioeconomic History    Marital status:      Spouse name: Not on file    Number of children: Not on file    Years of education: Not on file    Highest education level: Not on file   Tobacco Use    Smoking status: Current Every Day Smoker     Packs/day: 1.00     Years: 25.00     Pack years: 25.00    Smokeless tobacco: Never Used   Substance and Sexual Activity    Alcohol use: No    Drug use: No    Sexual activity: Yes       Social History     Tobacco Use   Smoking Status Current Every Day Smoker    Packs/day: 1.00    Years: 25.00    Pack years: 25.00   Smokeless Tobacco Never Used       Family History   Problem Relation Age of Onset    Heart Disease Father     Suicide Brother        Vitals:    04/28/21 0756   BP: (!) 122/90   BP 1 Location: Left upper arm   BP Patient Position: At rest   BP Cuff Size: Adult   Pulse: 80   Resp: 16   Temp: 97.3 °F (36.3 °C)   TempSrc: Core   SpO2: 99%   Weight: 153 lb 6.4 oz (69.6 kg)   Height: 5' 11\" (1.803 m)         3 most recent PHQ Screens 4/28/2021   Little interest or pleasure in doing things Not at all   Feeling down, depressed, irritable, or hopeless Not at all   Total Score PHQ 2 0       ROS:  Denies fever, chills, cough, chest pain or pressure, SOB/CHRIS,  nausea, vomiting, or diarrhea/constipation. No changes in vision or hearing. No new or worsening headaches. No edema, no claudication. Denies wt loss, wt gain, hemoptysis, hematochezia or melena. No dysuria, pyuria, frequency. No polydipsia, polyuria. No new weakness, arthralgias, myalgias. No weakness, dizziness, lightheadedness, numbness or tingling. No recent changes in moods.       Physical Examination:    GENERAL APPEARANCE: NAD, appears stated age, comfortable  VITAL SIGNS:   Visit Vitals  BP (!) 122/90 (BP 1 Location: Left upper arm, BP Patient Position: At rest, BP Cuff Size: Adult)   Pulse 80   Temp 97.3 °F (36.3 °C) (Core)   Resp 16   Ht 5' 11\" (1.803 m)   Wt 153 lb 6.4 oz (69.6 kg)   SpO2 99%   BMI 21.39 kg/m²     HEENT: Normocephalic and atraumatic. No scleral icterus. Pupils are equal, round, and reactive to light and accommodation. No conjunctival injection is noted. LUNGS: No acute respiratory distress  HEART: Pulses regular  EXTREMITIES: No cyanosis, clubbing, or edema. NEUROLOGIC: CN II-XII intact; no focal neurological deficits. PSYCHIATRIC: The patient is awake, alert, and oriented x3. SKIN: Warm, dry, and well perfused. Good turgor. No lesions, nodules or rashes are noted. 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 Average fasting a.m. blood glucose goal would be less than 130. Every 3 days adjust the long-acting insulin by 2 to 4 units at bedtime until optimal dose is reached for blood glucose of less than 130. We will then add rapid acting premeal bolus insulin, anticipate:  2-4 4 units, which the 4 units can be started with the largest meal to start. Will need to have a carb consistent diet. 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 We will order the freestyle nikkie 2 and refer to endocrinology for further assistance with diabetes.         Orders Placed This Encounter    flash glucose scanning reader (FreeStyle Maddi 2 [place]) misc     Sig: As directed     Dispense:  1 Each     Refill:  1    flash glucose sensor (FreeStyle Maddi 2 Sensor) kit     Sig: As directed     Dispense:  6 Kit     Refill:  5    insulin glargine (LANTUS,BASAGLAR) 100 unit/mL (3 mL) inpn     Si Units by SubCUTAneous route daily. Indications: type 2 diabetes mellitus     Dispense:  6 Pen     Refill:  5       Patient to return PRN for any new symptoms, call/come to clinic if notices any side effects from medication or any new side effects, and return for regularly scheduled visit    Patient verbalized understanding of and agreement to treatment plan. Patient has been advised to contact practice or seek care if condition persists or worsens. Vi Wakler,       This documentation was facilitated by voice recognition software and may contain inadvertent typographical errors. Please note that this dictation was completed with [employer], the computer voice recognition software. Quite often unanticipated grammatical, syntax, homophones, and other interpretive errors are inadvertently transcribed by the computer software. Please disregard these errors. Please excuse any errors that have escaped final proofreading.

## 2021-04-28 NOTE — PROGRESS NOTES
1. Have you been to the ER, urgent care clinic since your last visit? Hospitalized since your last visit? No    2. Have you seen or consulted any other health care providers outside of the [address] [place] [address] since your last visit? Include any pap smears or colon screening.  No           Learning Assessment 4/2/2021   PRIMARY LEARNER Patient   HIGHEST LEVEL OF EDUCATION - PRIMARY LEARNER  SOME COLLEGE   BARRIERS PRIMARY LEARNER NONE   CO-LEARNER CAREGIVER No   PRIMARY LANGUAGE ENGLISH   LEARNER PREFERENCE PRIMARY READING   ANSWERED BY Patient    RELATIONSHIP SELF

## 2021-04-28 NOTE — PATIENT INSTRUCTIONS
Latent autoimmune diabetes in adults (ESAU)  Older studies in predominantly [place] populations have suggested that as many as 7.5 to 10 percent of adults in populations with a high prevalence of type 1 diabetes and with apparent type 2 diabetes may have circulating autoantibodies directed against pancreatic beta cell antigens (ICA or GAD65) [2-4]. The prevalence of ESAU is almost certainly lower in the more diverse [facility] population. These adults do not require insulin at diagnosis but progress to insulin dependence after several months to years. This entity is sometimes referred to as \"latent autoimmune diabetes in adults\" (ESAU) and may account for a very small fraction of all cases of diabetes [5-7]. In genotyping analyses, ESAU shares genetic features of both type 1 and type 2 diabetes [8-10]. As an example, in one analysis, patients with ESAU shared an increased frequency of risk for an HLA-DQB1 genotype with patients with type 1 diabetes and for a variant in the transcription factor 7-like 2 (TCF7L2) gene with patients with type 2 diabetes [8]. The variant in TCF7L2 has been shown to increase the risk for type 2 diabetes in several populations, and the effect size was similar for ESAU and type 2 diabetes [9]. Thus, ESAU appears to be on the spectrum of insulin deficiency between type 1 and type 2 diabetes. (See \"Pathogenesis of type 1 diabetes mellitus\", section on 'Autoimmunity' and \"Pathogenesis of type 2 diabetes mellitus\", section on 'Genetic susceptibility'. )  Patients with ESAU are a heterogeneous group of patients with variable titers of antibodies, body mass index (BMI), and frequency of progression to insulin dependence [11]. Patients with high compared with low titers of GAD65 antibodies usually have a lower BMI, less endogenous insulin secretion (as measured by stimulated serum C-peptide concentrations), and progress more quickly to insulin dependence [11,12].  Thus, the presence and titers of anti-JONAS antibodies (or ICA) can help to identify patients thought to have type 2 diabetes, who are likely to respond poorly to oral hypoglycemic drug therapy, require insulin, and to be at increased risk for developing ketoacidosis [4,5,11-14]. Studies are underway to determine whether early treatment with insulin or use of immunomodulator therapy can prevent disease progression [6].

## 2021-04-29 PROBLEM — K04.7 TOOTH ABSCESS: Status: ACTIVE | Noted: 2021-04-29

## 2021-04-29 PROBLEM — R53.83 OTHER FATIGUE: Status: ACTIVE | Noted: 2021-04-29

## 2021-04-29 PROBLEM — L98.9 SKIN LESION: Status: ACTIVE | Noted: 2021-04-29

## 2021-04-29 NOTE — ASSESSMENT & PLAN NOTE
We will check lab work for [facility] as he is a nondiabetic, A1c to assess his diabetes control, CBC to assess for anemia which can all lead to fatigue. He does have noted weight loss so concern would be for either worsening of the diabetes, or another underlying inflammatory process. Screenings are up-to-date.

## 2021-04-29 NOTE — ASSESSMENT & PLAN NOTE
Treat with Augmentin twice daily for 10 days. Follow-up with dentistry. Can use Tylenol/ibuprofen for pain as well as warm or cold compresses in the area. If he should start to notice any systemic signs or symptoms of worsening, would see if can get in urgently to the dentist or go to the ER.

## 2021-04-29 NOTE — ASSESSMENT & PLAN NOTE
Continues monitor the lesion, can use mupirocin ointment and warm compresses in the area. Should he notice increasing erythema, drainage, or enlargement of the area, return to clinic. Would reassess at that point in time for I&D and further work-up.

## 2021-05-06 PROBLEM — E13.9 LADA (LATENT AUTOIMMUNE DIABETES IN ADULTS), MANAGED AS TYPE 1 (HCC): Status: ACTIVE | Noted: 2021-05-06

## 2021-05-06 PROBLEM — E11.9 TYPE 2 DIABETES MELLITUS WITHOUT COMPLICATION, WITHOUT LONG-TERM CURRENT USE OF INSULIN (HCC): Status: RESOLVED | Noted: 2017-02-28 | Resolved: 2021-05-06

## 2021-05-06 PROBLEM — E11.21 TYPE 2 DIABETES WITH NEPHROPATHY (HCC): Status: RESOLVED | Noted: 2018-03-29 | Resolved: 2021-05-06

## 2021-05-06 NOTE — ASSESSMENT & PLAN NOTE
Not responding to oral antiglycemic's with glargine-we will switch to insulin based regimen. Average fasting a.m. blood glucose goal would be less than 130. Every 3 days adjust the long-acting insulin by 2 to 4 units at bedtime until optimal dose is reached for blood glucose of less than 130. We will then add rapid acting premeal bolus insulin, anticipate:  2-4 4 units, which the 4 units can be started with the largest meal to start. Will need to have a carb consistent diet. 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 We will order the freestyle nikkie 2 and refer to endocrinology for further assistance with diabetes.

## 2021-05-07 ENCOUNTER — TELEPHONE (OUTPATIENT)
Dept: FAMILY MEDICINE CLINIC | Age: 46
End: 2021-05-07

## 2021-05-07 NOTE — TELEPHONE ENCOUNTER
----- Message from Vi Walker DO sent at 5/6/2021  3:50 PM EDT -----  Regarding: please schedule  Please schedule for follow up to discuss starting short acting insulin. THANKS!

## 2021-05-17 RX ORDER — GLIMEPIRIDE 4 MG/1
TABLET ORAL
Qty: 60 TAB | Refills: 2 | OUTPATIENT
Start: 2021-05-17

## 2021-06-19 DIAGNOSIS — I10 ESSENTIAL HYPERTENSION: ICD-10-CM

## 2021-06-21 RX ORDER — AMLODIPINE BESYLATE 10 MG/1
TABLET ORAL
Qty: 90 TABLET | Refills: 3 | Status: SHIPPED | OUTPATIENT
Start: 2021-06-21

## 2021-06-22 ENCOUNTER — TELEPHONE (OUTPATIENT)
Dept: FAMILY MEDICINE CLINIC | Age: 46
End: 2021-06-22

## 2021-06-22 DIAGNOSIS — E11.9 TYPE 2 DIABETES MELLITUS WITHOUT COMPLICATION, WITHOUT LONG-TERM CURRENT USE OF INSULIN (HCC): ICD-10-CM

## 2021-06-22 RX ORDER — INSULIN GLARGINE 100 [IU]/ML
25 INJECTION, SOLUTION SUBCUTANEOUS DAILY
Qty: 6 PEN | Refills: 5 | Status: SHIPPED | OUTPATIENT
Start: 2021-06-22 | End: 2022-01-06 | Stop reason: SDUPTHER

## 2021-06-22 RX ORDER — PEN NEEDLE, DIABETIC 30 GX3/16"
NEEDLE, DISPOSABLE MISCELLANEOUS
Qty: 1 PACKAGE | Refills: 1 | Status: SHIPPED | OUTPATIENT
Start: 2021-06-22

## 2021-06-22 RX ORDER — ISOPROPYL ALCOHOL 70 ML/100ML
SWAB TOPICAL
Qty: 100 PAD | Refills: 5 | Status: SHIPPED | OUTPATIENT
Start: 2021-06-22

## 2021-06-22 NOTE — TELEPHONE ENCOUNTER
Gary Kapoor was called, [facility] [place], stated OK and thanks so much.

## 2021-06-22 NOTE — TELEPHONE ENCOUNTER
Patient has scheduled an appointment with Jenna Estes next week but in the meantime needs refills on his Lantus, pen needles and alcohol wipes.   [facility]

## 2021-07-12 RX ORDER — VENLAFAXINE HYDROCHLORIDE 75 MG/1
CAPSULE, EXTENDED RELEASE ORAL
Qty: 90 CAPSULE | Refills: 1 | Status: SHIPPED | OUTPATIENT
Start: 2021-07-12 | End: 2022-01-13

## 2021-07-12 NOTE — TELEPHONE ENCOUNTER
The wife for this patient called this morning and stated that her  is in need of a refill for his Venlafaxine. She stated she is aware he needs to come in for an appointment and will contact the office back to arrange an appointment for him.     Thank you

## 2021-07-12 NOTE — TELEPHONE ENCOUNTER
Message routed to provider to approve refill.       Requested Prescriptions     Pending Prescriptions Disp Refills    venlafaxine-SR (EFFEXOR-XR) 75 mg capsule 90 Capsule 1     Sig: TAKE 1 CAPSULE BY MOUTH EVERY DAY

## 2021-08-13 RX ORDER — CHLORTHALIDONE 25 MG/1
TABLET ORAL
Qty: 30 TABLET | Refills: 2 | Status: SHIPPED | OUTPATIENT
Start: 2021-08-13 | End: 2022-02-22

## 2021-10-22 ENCOUNTER — TELEPHONE (OUTPATIENT)
Dept: FAMILY MEDICINE CLINIC | Age: 46
End: 2021-10-22

## 2021-10-22 NOTE — TELEPHONE ENCOUNTER
----- Message from Melo Kelly sent at 10/22/2021 10:16 AM EDT -----  Subject: Message to Provider    QUESTIONS  Information for Provider? Patient's wife is calling to see if the patient   still needs to take Metformin. Please call Kodak Warren  ---------------------------------------------------------------------------  --------------  Mauricio Cleverly INFO  What is the best way for the office to contact you? OK to leave message on   voicemail  Preferred Call Back Phone Number? [phone]  ---------------------------------------------------------------------------  --------------  SCRIPT ANSWERS  Relationship to Patient? Other  Representative Name? Kodak Warren  Is the Representative on the appropriate HIPAA document in Epic?  Yes

## 2021-10-22 NOTE — TELEPHONE ENCOUNTER
I have not seen Mr. General Nelsony. Recommend he makes an appointment with Ceci Hanley when she returns to address. Thanks!  DL

## 2021-12-03 ENCOUNTER — OFFICE VISIT (OUTPATIENT)
Dept: ENDOCRINOLOGY | Age: 46
End: 2021-12-03
Payer: COMMERCIAL

## 2021-12-03 VITALS
HEIGHT: 71 IN | HEART RATE: 106 BPM | WEIGHT: 144 LBS | SYSTOLIC BLOOD PRESSURE: 133 MMHG | DIASTOLIC BLOOD PRESSURE: 77 MMHG | BODY MASS INDEX: 20.16 KG/M2

## 2021-12-03 DIAGNOSIS — Z79.4 TYPE 2 DIABETES MELLITUS WITH HYPERGLYCEMIA, WITH LONG-TERM CURRENT USE OF INSULIN (HCC): ICD-10-CM

## 2021-12-03 DIAGNOSIS — E11.65 TYPE 2 DIABETES MELLITUS WITH HYPERGLYCEMIA, WITH LONG-TERM CURRENT USE OF INSULIN (HCC): ICD-10-CM

## 2021-12-03 DIAGNOSIS — E13.9 LADA (LATENT AUTOIMMUNE DIABETES IN ADULTS), MANAGED AS TYPE 1 (HCC): Primary | ICD-10-CM

## 2021-12-03 LAB — HBA1C MFR BLD HPLC: 15 % (ref ?–15)

## 2021-12-03 PROCEDURE — 99204 OFFICE O/P NEW MOD 45 MIN: CPT | Performed by: INTERNAL MEDICINE

## 2021-12-03 PROCEDURE — 83036 HEMOGLOBIN GLYCOSYLATED A1C: CPT | Performed by: INTERNAL MEDICINE

## 2021-12-03 RX ORDER — FLASH GLUCOSE SENSOR
KIT MISCELLANEOUS
Qty: 6 KIT | Refills: 5
Start: 2021-12-03 | End: 2021-12-06 | Stop reason: SDUPTHER

## 2021-12-03 RX ORDER — PEN NEEDLE, DIABETIC 31 GX3/16"
1 NEEDLE, DISPOSABLE MISCELLANEOUS
Qty: 400 PEN NEEDLE | Refills: 3 | Status: SHIPPED | OUTPATIENT
Start: 2021-12-03 | End: 2022-04-20 | Stop reason: SDUPTHER

## 2021-12-03 RX ORDER — ERGOCALCIFEROL 1.25 MG/1
50000 CAPSULE ORAL
Qty: 12 CAPSULE | Refills: 1 | Status: SHIPPED | OUTPATIENT
Start: 2021-12-03 | End: 2022-07-31

## 2021-12-03 RX ORDER — INSULIN LISPRO 100 [IU]/ML
INJECTION, SOLUTION INTRAVENOUS; SUBCUTANEOUS
Qty: 1 PEN | Refills: 0 | Status: SHIPPED | COMMUNITY
Start: 2021-12-03

## 2021-12-03 RX ORDER — INSULIN LISPRO 100 [IU]/ML
INJECTION, SOLUTION INTRAVENOUS; SUBCUTANEOUS
Qty: 30 ML | Refills: 3 | Status: SHIPPED | OUTPATIENT
Start: 2021-12-03 | End: 2022-01-06 | Stop reason: SDUPTHER

## 2021-12-03 RX ORDER — INSULIN LISPRO 100 [IU]/ML
INJECTION, SOLUTION INTRAVENOUS; SUBCUTANEOUS
Qty: 1 PEN | Refills: 0 | Status: SHIPPED | OUTPATIENT
Start: 2021-12-03 | End: 2021-12-03 | Stop reason: CLARIF

## 2021-12-03 RX ORDER — INSULIN GLARGINE 100 [IU]/ML
INJECTION, SOLUTION SUBCUTANEOUS
Qty: 30 ML | Refills: 3 | Status: SHIPPED | OUTPATIENT
Start: 2021-12-03

## 2021-12-03 RX ORDER — FLASH GLUCOSE SCANNING READER
EACH MISCELLANEOUS
Qty: 1 EACH | Refills: 0
Start: 2021-12-03 | End: 2021-12-06 | Stop reason: SDUPTHER

## 2021-12-03 NOTE — PROGRESS NOTES
Chief Complaint   Patient presents with    Diabetes       HPI  This is a 79-year-old male with a history of diabetes mellitus referred for evaluation and management. The patient tells me that when he was 34years old (14 years ago) he was told that he had type 2 diabetes. Tells me that there is no family history of diabetes. At that time he weighed 250 pounds and was placed on Metformin. He progressively lost weight and over the last 14 years has lost 100 pounds. About 7 years ago he was placed on Bydureon and then about 3 years ago insulin was added. In early 2020, he was on Bydureon, Lantus 10 units, glimepiride and Metformin. In reviewing his laboratory data, it appears that his A1c has been greater than 10% for the last 5 years. In April 2021, his primary care physician obtained a glutamic acid decarboxylase antibody which was markedy elevated  (1,167 U/ml ). Over the last 6 months he has been on 25 units of Lantus insulin  He has not been checking blood sugars and now presents for evaluation. His A1c today is greater than 15%. His blood sugar in our office is 421. Current Diabetes Medication  Lantus insulin 25 units at bedtime (his wife has always administered the insulin)  He has a freestyle nikkie system but has never been instructed in its use and has not started using it. He lives with his wife son and his parents. 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 He usually has rodriguez and toast and a [employer] [facility] for breakfast.  Lunch is a peanut butter and jelly sandwich and [employer] [facility]. He snacks on peanut butter crackers, chips and popcorn during the day. Dinner can be a hot dog and [employer] fries or popcorn or pizza or chicken with [employer] fries or mashed potatoes. He \"does not like vegetables\". His wife describes it as a food phobia. He snacks on ice cream or cookies or chocolate at night. He may occasionally have a bowl of cereal at night. His physical activity is that related to work.     Laboratory Data April 2021  Glucose 586  A1c greater than 14%  Creatinine 1.04  AST/ALT 8/20   TSH 3.81  Free T4 1.1  Vitamin D 18.3    ROS  Review of Systems - General ROS: negative  Psychological ROS: negative  Ophthalmic ROS: negative  ENT ROS: negative  Respiratory ROS: no cough, shortness of breath, or wheezing  Cardiovascular ROS: no chest pain or dyspnea on exertion  Gastrointestinal ROS: no abdominal pain, change in bowel habits, or black or bloody stools  Genito-Urinary ROS: no dysuria, trouble voiding, or hematuria  Musculoskeletal ROS: negative  Neurological ROS: no TIA or stroke symptoms  Dermatological ROS: negative  Family History   Problem Relation Age of Onset    Heart Disease Father     Suicide Brother         Social History     Socioeconomic History    Marital status:    Tobacco Use    Smoking status: Current Every Day Smoker     Packs/day: 1.00     Years: 25.00     Pack years: 25.00    Smokeless tobacco: Never Used   Substance and Sexual Activity    Alcohol use: No    Drug use: No    Sexual activity: Yes        Vitals:    12/03/21 1117   BP: 133/77   Pulse: (!) 106   Weight: 144 lb (65.3 kg)   Height: 5' 11\" (1.803 m)        Physical Examination: General appearance - alert, well appearing, and in no distress  Mental status - alert, oriented to person, place, and time  Neck - supple, no significant adenopathy, thyroid exam: thyroid is normal in size without nodules or tenderness  Chest - clear to auscultation, no wheezes, rales or rhonchi, symmetric air entry  Heart - normal rate, regular rhythm, normal S1, S2, no murmurs, rubs, clicks or gallops  Abdomen - soft, nontender, nondistended, no masses or organomegaly  Neurological - alert, oriented, normal speech, no focal findings or movement disorder noted  Musculoskeletal - no joint tenderness, deformity or swelling  Extremities - peripheral pulses normal, no pedal edema, no clubbing or cyanosis  Skin - normal coloration and turgor, no rashes, no suspicious skin lesions noted    Diabetic foot exam:     Left: Reflexes 2+     Vibratory sensation normal    Filament test normal sensation with micro filament   Pulse DP: 2+ (normal)   Pulse PT: 2+ (normal)   Deformities: None  Right: Reflexes 2+   Vibratory sensation normal   Filament test normal sensation with micro filament   Pulse DP: 2+ (normal)   Pulse PT: 2+ (normal)   Deformities: None      ASSESSMENT & PLAN  Impression  1. Type 1 diabetes mellitus/ESAU with an A1c today of greater than 15%  2. History of 100 # weight loss related to #1  3. Vitamin D Deficiency (18)    Plan:  1. I spent considerable time today talking about the pathogenesis of type 1 diabetes mellitus  2. We discussed continuation of the Lantus at 25 units daily (0.4 units/KG)  3. I instructed the patient in the use of an insulin pen. Based on a blood sugar of 421 in our office, he administered 6 units of Humalog insulin and demonstrated good understanding  4. I instructed him to give 1 unit for 15 g of carbohydrate or roughly 4 units with each meal  5. I instructed him in the use of a freestyle nikkie which he will begin doing  6. I sent Rx for VitD  7. I will see him back in 1 month.

## 2021-12-06 ENCOUNTER — TELEPHONE (OUTPATIENT)
Dept: ENDOCRINOLOGY | Age: 46
End: 2021-12-06

## 2021-12-06 DIAGNOSIS — E13.9 LADA (LATENT AUTOIMMUNE DIABETES IN ADULTS), MANAGED AS TYPE 1 (HCC): ICD-10-CM

## 2021-12-06 RX ORDER — FLASH GLUCOSE SCANNING READER
EACH MISCELLANEOUS
Qty: 1 EACH | Refills: 0 | Status: SHIPPED | OUTPATIENT
Start: 2021-12-06

## 2021-12-06 RX ORDER — FLASH GLUCOSE SENSOR
KIT MISCELLANEOUS
Qty: 6 KIT | Refills: 5 | Status: SHIPPED | OUTPATIENT
Start: 2021-12-06 | End: 2022-08-24 | Stop reason: SDUPTHER

## 2021-12-06 NOTE — TELEPHONE ENCOUNTER
Patient wife called and left a message. He was last seen in office 12/3/21 and it was discussed for him to start using the Lema. They thought they had one but apparently he does not and he needs a RX sent for it to the [facility]acy #[id number], [address] [place] .

## 2022-01-04 ENCOUNTER — TELEPHONE (OUTPATIENT)
Dept: FAMILY MEDICINE CLINIC | Age: 47
End: 2022-01-04

## 2022-01-04 DIAGNOSIS — E78.00 PURE HYPERCHOLESTEROLEMIA: ICD-10-CM

## 2022-01-04 RX ORDER — PRAVASTATIN SODIUM 10 MG/1
10 TABLET ORAL
Qty: 90 TABLET | Refills: 1 | Status: SHIPPED | OUTPATIENT
Start: 2022-01-04

## 2022-01-04 NOTE — TELEPHONE ENCOUNTER
----- Message from [facility] sent at 1/4/2022  1:13 PM EST -----  Subject: Refill Request    QUESTIONS  Name of Medication? pravastatin (PRAVACHOL) 10 mg tablet  Patient-reported dosage and instructions? once a day  How many days do you have left? 0  [id number]  Pharmacy phone number (if available)? [phone]  ---------------------------------------------------------------------------  --------------  CALL BACK INFO  What is the best way for the office to contact you? OK to leave message on   voicemail  Preferred Call Back Phone Number?  [phone]

## 2022-01-05 ENCOUNTER — TELEPHONE (OUTPATIENT)
Dept: ENDOCRINOLOGY | Age: 47
End: 2022-01-05

## 2022-01-05 DIAGNOSIS — E13.9 LADA (LATENT AUTOIMMUNE DIABETES IN ADULTS), MANAGED AS TYPE 1 (HCC): Primary | ICD-10-CM

## 2022-01-05 NOTE — TELEPHONE ENCOUNTER
1/5/2022  5:07 PM    Good Afternoon,    Pt wife called an would like to have pts insulin [facility]. Pts wife Halle Riky can be reached at [phone] or Mr. Ron Sampson can be reached at [phone].     Thanks  [employer]

## 2022-01-06 ENCOUNTER — TELEPHONE (OUTPATIENT)
Dept: ENDOCRINOLOGY | Age: 47
End: 2022-01-06

## 2022-01-06 RX ORDER — INSULIN LISPRO 100 [IU]/ML
INJECTION, SOLUTION INTRAVENOUS; SUBCUTANEOUS
Qty: 30 ML | Refills: 3 | Status: SHIPPED | OUTPATIENT
Start: 2022-01-06

## 2022-01-06 RX ORDER — INSULIN GLARGINE 100 [IU]/ML
INJECTION, SOLUTION SUBCUTANEOUS
Qty: 30 ML | Refills: 3 | Status: SHIPPED | OUTPATIENT
Start: 2022-01-06

## 2022-01-06 NOTE — TELEPHONE ENCOUNTER
Dr. Kelli Srivastava has sent in a script today for this.   Janet Mercado Pt instructed about need for urine and given urine sample cup.

## 2022-01-06 NOTE — TELEPHONE ENCOUNTER
1/6/2022  10:39 AM    Patient wife laine osei called on 01/05/22 at 9.36am  He needs his humolog quick pen refilled to the [facility] in [facility]- please call her back at [phone] or him at [phone] thanks

## 2022-01-07 ENCOUNTER — TELEPHONE (OUTPATIENT)
Dept: ENDOCRINOLOGY | Age: 47
End: 2022-01-07

## 2022-01-07 NOTE — TELEPHONE ENCOUNTER
I attempted to return this call and reached a voice mail. I left a message letting him know that the script has been sent to the pharmacy yesterday and to contact them about this.   Kaleb Jacobo

## 2022-01-07 NOTE — TELEPHONE ENCOUNTER
Is patient being followed her or Lively ? Problem: PAIN - ADULT  Goal: Verbalizes/displays adequate comfort level or baseline comfort level  Description: Interventions:  - Encourage patient to monitor pain and request assistance  - Assess pain using appropriate pain scale  - Administer analgesics based on type and severity of pain and evaluate response  - Implement non-pharmacological measures as appropriate and evaluate response  - Consider cultural and social influences on pain and pain management  - Notify physician/advanced practitioner if interventions unsuccessful or patient reports new pain  Outcome: Progressing

## 2022-01-07 NOTE — TELEPHONE ENCOUNTER
1/7/2022  9:00 AM    Patient called 01/06/22 at 12.40 to get prescription refill for humalog - please call him back at [phone] thanks

## 2022-02-22 RX ORDER — CHLORTHALIDONE 25 MG/1
TABLET ORAL
Qty: 45 TABLET | Refills: 1 | Status: SHIPPED | OUTPATIENT
Start: 2022-02-22

## 2022-03-17 ENCOUNTER — TELEPHONE (OUTPATIENT)
Dept: ENDOCRINOLOGY | Age: 47
End: 2022-03-17

## 2022-03-17 NOTE — TELEPHONE ENCOUNTER
3/17/2022  1:45PM        Pt wife called and wanted to find out if [facility] could prescribe some pain medicine for his spinalstenosis. Pt wife stated when cosulted in the beginning that being a condition he had. Surgery had been recommended but the Dr said that is not a good option until he get his diabetes under control. Pt wife would like to know if there is anything we can do until the pt have surgery.   SB#[phone]        Thanks,  Alejo Buchanan

## 2022-03-18 PROBLEM — K04.7 TOOTH ABSCESS: Status: ACTIVE | Noted: 2021-04-29

## 2022-03-18 PROBLEM — R53.83 OTHER FATIGUE: Status: ACTIVE | Noted: 2021-04-29

## 2022-03-18 PROBLEM — E78.00 PURE HYPERCHOLESTEROLEMIA: Status: ACTIVE | Noted: 2017-02-28

## 2022-03-19 PROBLEM — M54.50 LOW BACK PAIN: Status: ACTIVE | Noted: 2019-04-08

## 2022-03-19 PROBLEM — L98.9 SKIN LESION: Status: ACTIVE | Noted: 2021-04-29

## 2022-03-19 PROBLEM — F51.04 PSYCHOPHYSIOLOGICAL INSOMNIA: Status: ACTIVE | Noted: 2019-04-08

## 2022-03-19 PROBLEM — F17.200 SMOKING: Status: ACTIVE | Noted: 2017-02-28

## 2022-03-20 PROBLEM — E13.9 LADA (LATENT AUTOIMMUNE DIABETES IN ADULTS), MANAGED AS TYPE 1 (HCC): Status: ACTIVE | Noted: 2021-05-06

## 2022-03-21 NOTE — TELEPHONE ENCOUNTER
Spoke to pt. Pt has had modest improvement in blood sugars. Declined Rx for pain medication and recommended he see his PCP.

## 2022-04-20 DIAGNOSIS — E13.9 LADA (LATENT AUTOIMMUNE DIABETES IN ADULTS), MANAGED AS TYPE 1 (HCC): ICD-10-CM

## 2022-04-20 RX ORDER — PEN NEEDLE, DIABETIC 31 GX3/16"
1 NEEDLE, DISPOSABLE MISCELLANEOUS
Qty: 400 PEN NEEDLE | Refills: 3 | Status: SHIPPED | OUTPATIENT
Start: 2022-04-20

## 2022-04-20 NOTE — TELEPHONE ENCOUNTER
Requested Prescriptions     Pending Prescriptions Disp Refills    Insulin Needles, Disposable, (Jodie Pen Needle) 32 gauge x \" ndle 400 Pen Needle 3     Si Each by Does Not Apply route Before breakfast, lunch, dinner and at bedtime.

## 2022-04-20 NOTE — TELEPHONE ENCOUNTER
Pt wife called 4/20 @ 1:04pm. Stated pt needs a refill on his insulin needles and also wants to know if he needs labs done before his next appt.     Wife# [phone]

## 2022-06-22 ENCOUNTER — VIRTUAL VISIT (OUTPATIENT)
Dept: ENDOCRINOLOGY | Age: 47
End: 2022-06-22
Payer: COMMERCIAL

## 2022-06-22 DIAGNOSIS — E13.9 LADA (LATENT AUTOIMMUNE DIABETES IN ADULTS), MANAGED AS TYPE 1 (HCC): Primary | ICD-10-CM

## 2022-06-22 PROCEDURE — 99214 OFFICE O/P EST MOD 30 MIN: CPT | Performed by: INTERNAL MEDICINE

## 2022-06-22 NOTE — PROGRESS NOTES
This patient was evaluated through a synchronous (real-time) audio-video encounter. The patient (or guardian if applicable) is aware that this is a billable service, which includes applicable co-pays. This Virtual Visit was conducted with patient's (and/or legal guardian's) consent. The visit was conducted pursuant to the emergency declaration under the [facility], [address] [place] [address] and the [employer] Act. Patient identification was verified, and a caregiver was present when appropriate. The patient was located at: Home: [address]  The provider was located at: [facility] [address] [place] [phone]    This is a 53-year-old male with a history of diabetes mellitus referred for evaluation and management. The patient tells me that when he was 34years old (14 years ago) he was told that he had type 2 diabetes. Tells me that there is no family history of diabetes. At that time he weighed 250 pounds and was placed on Metformin. He progressively lost weight and over the last 14 years has lost 100 pounds. About 7 years ago he was placed on Bydureon and then about 3 years ago insulin was added. In early 2020, he was on Bydureon, Lantus 10 units, glimepiride and Metformin. In reviewing his laboratory data, it appears that his A1c has been greater than 10% for the last 5 years. In April 2021, his primary care physician obtained a glutamic acid decarboxylase antibody which was markedy elevated  (1,167 U/ml ). Over the last 6 months he has been on 25 units of Lantus insulin  He has not been checking blood sugars and now presents for evaluation. When I saw him for the first and only time, his A1c was  greater than 15%. His blood sugar was 421. At that visit, I started basal bolus insulin.   He was instructed to take 1 unit for every 15 g of carbohydrate. Unfortunately his father passed away and he has been focusing there for the last 6 months. He now presents for follow-up.     Current Diabetes Medication  Lantus insulin 25 units in AM (his wife has always administered the insulin)  Novolog 6-8 units per meal  He has a freestyle nikkie system but just started it this weekend     He lives with his wife son and his parents. 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 He usually has rodriguez and toast and a [employer] [facility] [employer] [facility] for breakfast.  Lunch is a peanut butter and jelly sandwich and [employer] [facility]. He snacks on peanut butter crackers, chips and popcorn during the day. Dinner can be a hot dog and [employer] fries or popcorn or pizza or chicken with [employer] fries or mashed potatoes. He \"does not like vegetables\". His wife describes it as a food phobia. He snacks on ice cream or cookies or chocolate at night. He may occasionally have a bowl of cereal at night. His physical activity is that related to work. He does tell me that he has gained weight from 144 pounds at our first visit to a current weight of 151 pounds. Examination  GENERAL: NCAT, Appears well nourished   EYES: EOMI, non-icteric, no proptosis   Ear/Nose/Throat: NCAT, no visible inflammation or masses   CARDIOVASCULAR: no cyanosis, no visible JVD   RESPIRATORY: comfortable respirations observed, no cyanosis   MUSCULOSKELETAL: Normal ROM of upper extremities observed   SKIN: No edema, rash, or other significant changes observed   NEUROLOGIC:  AAOx3   PSYCHIATRIC: Normal affect, Normal insight and judgement       Pression  1. Type 1 diabetes mellitus currently on basal bolus insulin just beginning the freestyle nikkie and just beginning to understand carbohydrate counting. Plan:  1. With continue the Lantus 25 units which he takes in the morning  2. I encouraged him to take 1 unit for 15 g of carbohydrate  3.   I advised him to use 1 unit for every 50/150 as a correction  4. I strongly discouraged stacking stacking insulin and advised him to use the freestyle nikkie to determine how well the doses of insulin work for him but to not take insulin when the blood sugar is elevated between meals. And 5.   I will see him back in 3 months face-to-face.

## 2022-07-31 DIAGNOSIS — E13.9 LADA (LATENT AUTOIMMUNE DIABETES IN ADULTS), MANAGED AS TYPE 1 (HCC): ICD-10-CM

## 2022-07-31 RX ORDER — ERGOCALCIFEROL 1.25 MG/1
50000 CAPSULE ORAL
Qty: 12 CAPSULE | Refills: 1 | Status: SHIPPED | OUTPATIENT
Start: 2022-07-31

## 2022-08-01 DIAGNOSIS — I10 ESSENTIAL HYPERTENSION: ICD-10-CM

## 2022-08-01 RX ORDER — AMLODIPINE BESYLATE 10 MG/1
TABLET ORAL
Qty: 90 TABLET | Refills: 3 | OUTPATIENT
Start: 2022-08-01

## 2022-08-24 DIAGNOSIS — E13.9 LADA (LATENT AUTOIMMUNE DIABETES IN ADULTS), MANAGED AS TYPE 1 (HCC): ICD-10-CM

## 2022-08-24 RX ORDER — FLASH GLUCOSE SENSOR
KIT MISCELLANEOUS
Qty: 6 KIT | Refills: 5 | Status: SHIPPED | OUTPATIENT
Start: 2022-08-24

## 2022-08-29 RX ORDER — CHLORTHALIDONE 25 MG/1
TABLET ORAL
Qty: 45 TABLET | Refills: 1 | OUTPATIENT
Start: 2022-08-29

## 2022-09-19 ENCOUNTER — TRANSCRIBE ORDER (OUTPATIENT)
Dept: REGISTRATION | Age: 47
End: 2022-09-19

## 2022-09-19 ENCOUNTER — HOSPITAL ENCOUNTER (OUTPATIENT)
Dept: GENERAL RADIOLOGY | Age: 47
Discharge: HOME | End: 2022-09-19
Payer: COMMERCIAL

## 2022-09-19 DIAGNOSIS — M25.532 LEFT WRIST PAIN: Primary | ICD-10-CM

## 2022-09-19 DIAGNOSIS — M25.532 LEFT WRIST PAIN: ICD-10-CM

## 2022-09-19 PROCEDURE — 73110 X-RAY EXAM OF WRIST: CPT

## 2022-10-03 RX ORDER — CHLORTHALIDONE 25 MG/1
TABLET ORAL
Qty: 45 TABLET | Refills: 1 | OUTPATIENT
Start: 2022-10-03

## 2022-12-30 DIAGNOSIS — E13.9 LADA (LATENT AUTOIMMUNE DIABETES IN ADULTS), MANAGED AS TYPE 1 (HCC): ICD-10-CM

## 2022-12-30 RX ORDER — ERGOCALCIFEROL 1.25 MG/1
50000 CAPSULE ORAL
Qty: 12 CAPSULE | Refills: 1 | Status: SHIPPED | OUTPATIENT
Start: 2022-12-30

## 2023-02-17 ENCOUNTER — HOSPITAL ENCOUNTER (OUTPATIENT)
Dept: GENERAL RADIOLOGY | Age: 48
Discharge: HOME | End: 2023-02-17
Payer: COMMERCIAL

## 2023-02-17 ENCOUNTER — TRANSCRIBE ORDER (OUTPATIENT)
Dept: REGISTRATION | Age: 48
End: 2023-02-17

## 2023-02-17 DIAGNOSIS — M86.172 ACUTE OSTEOMYELITIS OF LEFT ANKLE OR FOOT (HCC): ICD-10-CM

## 2023-02-17 DIAGNOSIS — M86.172 ACUTE OSTEOMYELITIS OF LEFT ANKLE OR FOOT (HCC): Primary | ICD-10-CM

## 2023-02-17 PROCEDURE — 73610 X-RAY EXAM OF ANKLE: CPT

## 2023-04-03 PROBLEM — E11.9 TYPE 2 DIABETES MELLITUS WITHOUT COMPLICATION, WITHOUT LONG-TERM CURRENT USE OF INSULIN (HCC): Status: RESOLVED | Noted: 2017-02-28 | Resolved: 2021-05-06

## 2023-04-03 PROBLEM — E11.21 TYPE 2 DIABETES WITH NEPHROPATHY (HCC): Status: RESOLVED | Noted: 2018-03-29 | Resolved: 2021-05-06

## 2023-09-30 DIAGNOSIS — E13.9 OTHER SPECIFIED DIABETES MELLITUS WITHOUT COMPLICATIONS (HCC): ICD-10-CM

## 2023-09-30 RX ORDER — ERGOCALCIFEROL 1.25 MG/1
50000 CAPSULE ORAL
Qty: 12 CAPSULE | Refills: 1 | OUTPATIENT
Start: 2023-09-30

## 2023-10-30 RX ORDER — ERGOCALCIFEROL 1.25 MG/1
50000 CAPSULE ORAL
Qty: 12 CAPSULE | Refills: 1 | OUTPATIENT
Start: 2023-10-30

## 2023-11-13 ENCOUNTER — HOSPITAL ENCOUNTER (EMERGENCY)
Facility: HOSPITAL | Age: 48
Discharge: HOME | End: 2023-11-14

## 2024-08-31 ENCOUNTER — HOSPITAL ENCOUNTER (EMERGENCY)
Facility: HOSPITAL | Age: 49
Discharge: HOME | End: 2024-08-31
Payer: COMMERCIAL

## 2024-08-31 VITALS
OXYGEN SATURATION: 95 % | HEIGHT: 71 IN | RESPIRATION RATE: 15 BRPM | BODY MASS INDEX: 21.7 KG/M2 | WEIGHT: 155 LBS | TEMPERATURE: 98.7 F | SYSTOLIC BLOOD PRESSURE: 175 MMHG | HEART RATE: 64 BPM | DIASTOLIC BLOOD PRESSURE: 96 MMHG

## 2024-08-31 DIAGNOSIS — M21.331 RIGHT WRIST DROP: Primary | ICD-10-CM

## 2024-08-31 DIAGNOSIS — G56.91: ICD-10-CM

## 2024-08-31 PROCEDURE — 99284 EMERGENCY DEPT VISIT MOD MDM: CPT

## 2024-08-31 PROCEDURE — 6360000002 HC RX W HCPCS: Performed by: PHYSICIAN ASSISTANT

## 2024-08-31 PROCEDURE — 96372 THER/PROPH/DIAG INJ SC/IM: CPT

## 2024-08-31 RX ORDER — DEXAMETHASONE SODIUM PHOSPHATE 10 MG/ML
10 INJECTION, SOLUTION INTRAMUSCULAR; INTRAVENOUS ONCE
Status: COMPLETED | OUTPATIENT
Start: 2024-08-31 | End: 2024-08-31

## 2024-08-31 RX ADMIN — DEXAMETHASONE SODIUM PHOSPHATE 10 MG: 10 INJECTION, SOLUTION INTRAMUSCULAR; INTRAVENOUS at 11:41

## 2024-08-31 ASSESSMENT — LIFESTYLE VARIABLES
HOW OFTEN DO YOU HAVE A DRINK CONTAINING ALCOHOL: NEVER
HOW MANY STANDARD DRINKS CONTAINING ALCOHOL DO YOU HAVE ON A TYPICAL DAY: PATIENT DOES NOT DRINK

## 2024-08-31 ASSESSMENT — PAIN SCALES - GENERAL: PAINLEVEL_OUTOF10: 0

## 2024-08-31 NOTE — ED PROVIDER NOTES
[facility] EMERGENCY DEP  EMERGENCY DEPARTMENT ENCOUNTER         Pt Name: Michael Ledbetter  MRN: [id number]  Birthdate 1975  Date of evaluation: 8/31/2024  Provider: Jered Vasquez PA-C   PCP: Ricky Galvez MD  Note Started: 11:31 AM EDT 8/31/24     CHIEF COMPLAINT       Chief Complaint   Patient presents with    Numbness        HISTORY OF PRESENT ILLNESS: 1 or more elements      History From: Patient  HPI Limitations: None     Michael Ledbetter is a 49 y.o. male who presents RUE numbness that started 3 days ago from the forearm down to the hand.  Pt states he is unable flex at the wrist but denies any injury or trauma.  Pt states he talked to his PCP who advised that symptoms were likely carpal tunnel and he should go get a brace.  Pt did not agree with that dx as the symptoms started \"overnight\" so he never got a brace.  He did have xrays of the C spine, hand and wrist done on 8/29/24     Nursing Notes were all reviewed and agreed with or any disagreements were addressed in the HPI.  Please see MDM for additional details of HPI and ROS     REVIEW OF SYSTEMS      Review of Systems     Positives and Pertinent negatives as per HPI.    PAST HISTORY     Past Medical History:  Past Medical History:   Diagnosis Date    Anxiety     Diabetes (HCC)     HPV in male     Hypertension     Insomnia     Sinus problem     Stress     Type 2 diabetes mellitus without complication, without long-term current use of insulin ([facility]) 2/28/2017    Type 2 diabetes with nephropathy ([facility]) 3/29/2018       Past Surgical History:  History reviewed. No pertinent surgical history.    Family History:  Family History   Problem Relation Age of Onset    Heart Disease Father     Suicide Brother        Social History:  Social History     Tobacco Use    Smoking status: Every Day     Current packs/day: 1.00     Types: Cigarettes    Smokeless tobacco: Never   Substance Use Topics    Alcohol use: No    Drug use: No       Allergies:  No Known Allergies    CURRENT  MEDICATIONS      Previous Medications    ACETAMINOPHEN (TYLENOL) 500 MG TABLET    Take 1,000 mg by mouth    AMLODIPINE (NORVASC) 10 MG TABLET    TAKE 1 TABLET BY MOUTH DAILY FOR PRESSURE    CETIRIZINE (ZYRTEC) 10 MG TABLET    Take 1 tablet by mouth daily    CHLORTHALIDONE (HYGROTON) 25 MG TABLET    Take 0.5 tablets by mouth daily    ERGOCALCIFEROL (ERGOCALCIFEROL) 1.25 MG (07100 UT) CAPSULE    Take 50,000 Units by mouth every 7 days    INSULIN GLARGINE (LANTUS SOLOSTAR) 100 UNIT/ML INJECTION PEN    25 units once daily  Indications: type 1 diabetes mellitus    INSULIN LISPRO, 1 UNIT DIAL, (HUMALOG KWIKPEN) 100 UNIT/ML SOPN    4-10 units TID    KETOROLAC (TORADOL) 10 MG TABLET    Take 10 mg by mouth    LANCETS MISC    To check blood sugars 1-2 times daily. Diagnosis: Diabetes mellitus type 2.    PRAVASTATIN (PRAVACHOL) 10 MG TABLET    Take 1 tablet by mouth nightly    TIZANIDINE (ZANAFLEX) 2 MG TABLET    Take 2-4 mg by mouth 2 times daily as needed    VENLAFAXINE (EFFEXOR XR) 75 MG EXTENDED RELEASE CAPSULE    Take 1 tablet by mouth daily       PHYSICAL EXAM      ED Triage Vitals   Encounter Vitals Group      BP 08/31/24 1112 (!) 220/97      Systolic BP Percentile --       Diastolic BP Percentile --       Pulse 08/31/24 1110 75      Respirations 08/31/24 1110 15      Temp 08/31/24 1112 98.7 °F (37.1 °C)      Temp Source 08/31/24 1106 Oral      SpO2 08/31/24 1110 98 %      Weight - Scale 08/31/24 1110 70.3 kg (155 lb)      Height 08/31/24 1110 1.803 m (5' 11\")      Head Circumference --       Peak Flow --       Pain Score --       Pain Loc --       Pain Education --       Exclude from Growth Chart --         Physical Exam  Vitals and nursing note reviewed.   Constitutional:       General: He is not in acute distress.  HENT:      Head: Normocephalic and atraumatic.   Eyes:      Conjunctiva/sclera: Conjunctivae normal.   Cardiovascular:      Rate and Rhythm: Normal rate and regular rhythm.   Pulmonary:      Effort:

## 2024-08-31 NOTE — ED NOTES
I have reviewed discharge instructions with the patient. The patient verbalized understanding.  No questions at this time. Ambulated without difficulty.

## 2024-10-16 ENCOUNTER — HOSPITAL ENCOUNTER (EMERGENCY)
Facility: HOSPITAL | Age: 49
Discharge: LEFT BEFORE DISCHARGE | End: 2024-10-16
Attending: EMERGENCY MEDICINE | Admitting: HOSPITALIST
Payer: COMMERCIAL

## 2024-10-16 ENCOUNTER — APPOINTMENT (OUTPATIENT)
Facility: HOSPITAL | Age: 49
End: 2024-10-16
Payer: COMMERCIAL

## 2024-10-16 VITALS
TEMPERATURE: 98.6 F | OXYGEN SATURATION: 96 % | WEIGHT: 150 LBS | HEART RATE: 113 BPM | BODY MASS INDEX: 20.92 KG/M2 | SYSTOLIC BLOOD PRESSURE: 168 MMHG | RESPIRATION RATE: 15 BRPM | DIASTOLIC BLOOD PRESSURE: 85 MMHG

## 2024-10-16 DIAGNOSIS — K52.9 COLITIS: ICD-10-CM

## 2024-10-16 DIAGNOSIS — A41.9 SEVERE SEPSIS (HCC): Primary | ICD-10-CM

## 2024-10-16 DIAGNOSIS — R65.20 SEVERE SEPSIS (HCC): Primary | ICD-10-CM

## 2024-10-16 DIAGNOSIS — R11.2 NAUSEA AND VOMITING, UNSPECIFIED VOMITING TYPE: ICD-10-CM

## 2024-10-16 LAB
ALBUMIN SERPL-MCNC: 3.1 G/DL (ref 3.5–5)
ALBUMIN/GLOB SERPL: 1 (ref 1.1–2.2)
ALP SERPL-CCNC: 174 U/L (ref 45–117)
ALT SERPL-CCNC: 22 U/L (ref 12–78)
ANION GAP SERPL CALC-SCNC: 12 MMOL/L (ref 2–12)
AST SERPL-CCNC: 18 U/L (ref 15–37)
BASOPHILS # BLD: 0 K/UL (ref 0–0.1)
BASOPHILS NFR BLD: 0 % (ref 0–1)
BILIRUB SERPL-MCNC: 0.7 MG/DL (ref 0.2–1)
BUN SERPL-MCNC: 22 MG/DL (ref 6–20)
BUN/CREAT SERPL: 18 (ref 12–20)
CALCIUM SERPL-MCNC: 9.5 MG/DL (ref 8.5–10.1)
CHLORIDE SERPL-SCNC: 98 MMOL/L (ref 97–108)
CO2 SERPL-SCNC: 29 MMOL/L (ref 21–32)
CREAT SERPL-MCNC: 1.2 MG/DL (ref 0.7–1.3)
DIFFERENTIAL METHOD BLD: ABNORMAL
EOSINOPHIL # BLD: 0 K/UL (ref 0–0.4)
EOSINOPHIL NFR BLD: 0 % (ref 0–7)
ERYTHROCYTE [DISTWIDTH] IN BLOOD BY AUTOMATED COUNT: 12 % (ref 11.5–14.5)
FLUAV RNA SPEC QL NAA+PROBE: NOT DETECTED
FLUBV RNA SPEC QL NAA+PROBE: NOT DETECTED
GLOBULIN SER CALC-MCNC: 3 G/DL (ref 2–4)
GLUCOSE SERPL-MCNC: 349 MG/DL (ref 65–100)
HCT VFR BLD AUTO: 46.7 % (ref 36.6–50.3)
HGB BLD-MCNC: 16.1 G/DL (ref 12.1–17)
IMM GRANULOCYTES # BLD AUTO: 0.1 K/UL (ref 0–0.04)
IMM GRANULOCYTES NFR BLD AUTO: 0 % (ref 0–0.5)
LACTATE SERPL-SCNC: 1.2 MMOL/L (ref 0.4–2)
LACTATE SERPL-SCNC: 2.6 MMOL/L (ref 0.4–2)
LYMPHOCYTES # BLD: 1.1 K/UL (ref 0.8–3.5)
LYMPHOCYTES NFR BLD: 8 % (ref 12–49)
MAGNESIUM SERPL-MCNC: 1.7 MG/DL (ref 1.6–2.4)
MCH RBC QN AUTO: 30 PG (ref 26–34)
MCHC RBC AUTO-ENTMCNC: 34.5 G/DL (ref 30–36.5)
MCV RBC AUTO: 87.1 FL (ref 80–99)
MONOCYTES # BLD: 0.4 K/UL (ref 0–1)
MONOCYTES NFR BLD: 3 % (ref 5–13)
NEUTS SEG # BLD: 11.5 K/UL (ref 1.8–8)
NEUTS SEG NFR BLD: 89 % (ref 32–75)
NRBC # BLD: 0 K/UL (ref 0–0.01)
NRBC BLD-RTO: 0 PER 100 WBC
PLATELET # BLD AUTO: 279 K/UL (ref 150–400)
PMV BLD AUTO: 9.8 FL (ref 8.9–12.9)
POTASSIUM SERPL-SCNC: 4.1 MMOL/L (ref 3.5–5.1)
PROT SERPL-MCNC: 6.1 G/DL (ref 6.4–8.2)
RBC # BLD AUTO: 5.36 M/UL (ref 4.1–5.7)
SARS-COV-2 RNA RESP QL NAA+PROBE: NOT DETECTED
SODIUM SERPL-SCNC: 139 MMOL/L (ref 136–145)
SOURCE: NORMAL
TROPONIN I SERPL HS-MCNC: 42 NG/L (ref 0–76)
WBC # BLD AUTO: 13 K/UL (ref 4.1–11.1)

## 2024-10-16 PROCEDURE — 84484 ASSAY OF TROPONIN QUANT: CPT

## 2024-10-16 PROCEDURE — 87636 SARSCOV2 & INF A&B AMP PRB: CPT

## 2024-10-16 PROCEDURE — 1100000000 HC RM PRIVATE

## 2024-10-16 PROCEDURE — 71045 X-RAY EXAM CHEST 1 VIEW: CPT

## 2024-10-16 PROCEDURE — 84145 PROCALCITONIN (PCT): CPT

## 2024-10-16 PROCEDURE — 96365 THER/PROPH/DIAG IV INF INIT: CPT

## 2024-10-16 PROCEDURE — 6360000002 HC RX W HCPCS: Performed by: EMERGENCY MEDICINE

## 2024-10-16 PROCEDURE — 36415 COLL VENOUS BLD VENIPUNCTURE: CPT

## 2024-10-16 PROCEDURE — 70450 CT HEAD/BRAIN W/O DYE: CPT

## 2024-10-16 PROCEDURE — 74177 CT ABD & PELVIS W/CONTRAST: CPT

## 2024-10-16 PROCEDURE — 87040 BLOOD CULTURE FOR BACTERIA: CPT

## 2024-10-16 PROCEDURE — 2580000003 HC RX 258: Performed by: EMERGENCY MEDICINE

## 2024-10-16 PROCEDURE — 93005 ELECTROCARDIOGRAM TRACING: CPT | Performed by: EMERGENCY MEDICINE

## 2024-10-16 PROCEDURE — 83735 ASSAY OF MAGNESIUM: CPT

## 2024-10-16 PROCEDURE — 6370000000 HC RX 637 (ALT 250 FOR IP): Performed by: EMERGENCY MEDICINE

## 2024-10-16 PROCEDURE — 99285 EMERGENCY DEPT VISIT HI MDM: CPT

## 2024-10-16 PROCEDURE — 85025 COMPLETE CBC W/AUTO DIFF WBC: CPT

## 2024-10-16 PROCEDURE — 80053 COMPREHEN METABOLIC PANEL: CPT

## 2024-10-16 PROCEDURE — 96375 TX/PRO/DX INJ NEW DRUG ADDON: CPT

## 2024-10-16 PROCEDURE — 96374 THER/PROPH/DIAG INJ IV PUSH: CPT

## 2024-10-16 PROCEDURE — 83605 ASSAY OF LACTIC ACID: CPT

## 2024-10-16 PROCEDURE — 6360000004 HC RX CONTRAST MEDICATION: Performed by: EMERGENCY MEDICINE

## 2024-10-16 PROCEDURE — 96361 HYDRATE IV INFUSION ADD-ON: CPT

## 2024-10-16 RX ORDER — IOPAMIDOL 755 MG/ML
100 INJECTION, SOLUTION INTRAVASCULAR
Status: COMPLETED | OUTPATIENT
Start: 2024-10-16 | End: 2024-10-16

## 2024-10-16 RX ORDER — 0.9 % SODIUM CHLORIDE 0.9 %
30 INTRAVENOUS SOLUTION INTRAVENOUS ONCE
Status: COMPLETED | OUTPATIENT
Start: 2024-10-16 | End: 2024-10-16

## 2024-10-16 RX ORDER — MORPHINE SULFATE 4 MG/ML
4 INJECTION, SOLUTION INTRAMUSCULAR; INTRAVENOUS ONCE
Status: COMPLETED | OUTPATIENT
Start: 2024-10-16 | End: 2024-10-16

## 2024-10-16 RX ORDER — ONDANSETRON 2 MG/ML
4 INJECTION INTRAMUSCULAR; INTRAVENOUS ONCE
Status: COMPLETED | OUTPATIENT
Start: 2024-10-16 | End: 2024-10-16

## 2024-10-16 RX ORDER — ONDANSETRON 4 MG/1
4 TABLET, ORALLY DISINTEGRATING ORAL 3 TIMES DAILY PRN
Qty: 20 TABLET | Refills: 0 | Status: SHIPPED | OUTPATIENT
Start: 2024-10-16

## 2024-10-16 RX ORDER — 0.9 % SODIUM CHLORIDE 0.9 %
1000 INTRAVENOUS SOLUTION INTRAVENOUS ONCE
Status: DISCONTINUED | OUTPATIENT
Start: 2024-10-16 | End: 2024-10-17 | Stop reason: HOSPADM

## 2024-10-16 RX ORDER — CLONIDINE HYDROCHLORIDE 0.1 MG/1
0.1 TABLET ORAL
Status: COMPLETED | OUTPATIENT
Start: 2024-10-16 | End: 2024-10-16

## 2024-10-16 RX ORDER — PROCHLORPERAZINE EDISYLATE 5 MG/ML
10 INJECTION INTRAMUSCULAR; INTRAVENOUS ONCE
Status: COMPLETED | OUTPATIENT
Start: 2024-10-16 | End: 2024-10-16

## 2024-10-16 RX ADMIN — CLONIDINE HYDROCHLORIDE 0.1 MG: 0.1 TABLET ORAL at 21:16

## 2024-10-16 RX ADMIN — PROCHLORPERAZINE EDISYLATE 10 MG: 5 INJECTION INTRAMUSCULAR; INTRAVENOUS at 19:07

## 2024-10-16 RX ADMIN — SODIUM CHLORIDE 2040 ML: 9 INJECTION, SOLUTION INTRAVENOUS at 18:26

## 2024-10-16 RX ADMIN — ONDANSETRON 4 MG: 2 INJECTION, SOLUTION INTRAMUSCULAR; INTRAVENOUS at 18:27

## 2024-10-16 RX ADMIN — IOPAMIDOL 100 ML: 755 INJECTION, SOLUTION INTRAVENOUS at 21:10

## 2024-10-16 RX ADMIN — PIPERACILLIN AND TAZOBACTAM 4500 MG: 4; .5 INJECTION, POWDER, LYOPHILIZED, FOR SOLUTION INTRAVENOUS at 22:26

## 2024-10-16 RX ADMIN — MORPHINE SULFATE 4 MG: 4 INJECTION, SOLUTION INTRAMUSCULAR; INTRAVENOUS at 19:07

## 2024-10-16 ASSESSMENT — LIFESTYLE VARIABLES
HOW MANY STANDARD DRINKS CONTAINING ALCOHOL DO YOU HAVE ON A TYPICAL DAY: PATIENT DOES NOT DRINK
HOW OFTEN DO YOU HAVE A DRINK CONTAINING ALCOHOL: NEVER

## 2024-10-16 ASSESSMENT — PAIN - FUNCTIONAL ASSESSMENT: PAIN_FUNCTIONAL_ASSESSMENT: NONE - DENIES PAIN

## 2024-10-16 NOTE — ED PROVIDER NOTES
this dictation were completed with voice recognition software. Quite often unanticipated grammatical, syntax, homophones, and other interpretive errors are inadvertently transcribed by the computer software. Please disregards these errors. Please excuse any errors that have escaped final proofreading.)         Jackson Perez,   10/17/24 0799

## 2024-10-17 LAB — PROCALCITONIN SERPL-MCNC: 0.13 NG/ML

## 2024-10-17 NOTE — ED NOTES
Admission SBAR Note  Situation/Background:     Patient is being transferred to med/surg ([facility]), Room# 138     Patient's Chief Complaint was Emesis and is admitted for Sepsis,colitis, nausea and vomiting.    CODE STATUS: Full  CSSRS: 0 - No Risk    ISOLATION/PRECAUTIONS: No  ISOLATION TYPE:     Is this a behavioral health patient? No  Has wanding been completed No  Are belongings secure? Yes    Called outstanding consults:     STAT labs collected: Yes    Repeat Lactic Acid DUE? No  TIME DUE:     All STAT orders are complete: Yes    The following personal items will be sent with the patient during transfer to the floor:     All valuables: none       ASSESSMENT:    NEURO:   NIH SCORE: 0,1-4,5-15,15-20,21-42: 0   GALINA SWALLOW SCREEN COMPLETE: Yes  ORIENTATION LEVEL: ORIENTATION LEVEL: Person, Place, Time, and Situation  Cognition:  appropriate decision making  Speech: shows no evidence of impairment    Is patient impulsive? No  Is patient oriented? Yes  Do they follow commands? Yes  Is the patient ambulatory? Yes    FALL RISK? No  Interventions: Implemented/recommended environmental changes (remove hazards, lower bed, improve lighting, etc.)    RESPIRATORY:   Is patient on oxygen? No  Oxygen therapy: room air  O2 rate:     CARDIAC:   Is cardiac monitoring ordered? Yes    Last Rhythm: Rhythm including paccardio: Tachycardic   Patient to transfer with tele box on? Yes  Infusions: Meds; iv fluids: none  LINE ACCESS: 20G Peripheral IV , Antecubital and Right , Iv rate: prn       /GI:   Continent Bowel/Bladder? Yes  Urinary Output:   Chronic or Acute:   If Chronic, is it 3 days old, was it changed prior to specimen collection? YEs  Was UA with reflex sent to lab? Yes  If no, collect and send prior to transport to inpatient area.    INTEGUMENTARY:  IS THE PATIENT UNDRESSED? Yes  ARE THERE WOUNDS PRESENT? No  ARE THE WOUNDS DOCUMENTED? No    RESTRAINTS IN

## 2024-10-18 LAB
EKG ATRIAL RATE: 125 BPM
EKG DIAGNOSIS: NORMAL
EKG P AXIS: 86 DEGREES
EKG P-R INTERVAL: 158 MS
EKG Q-T INTERVAL: 302 MS
EKG QRS DURATION: 66 MS
EKG QTC CALCULATION (BAZETT): 435 MS
EKG R AXIS: 56 DEGREES
EKG T AXIS: 92 DEGREES
EKG VENTRICULAR RATE: 125 BPM

## 2024-10-20 LAB
BACTERIA SPEC CULT: NORMAL
BACTERIA SPEC CULT: NORMAL
SERVICE CMNT-IMP: NORMAL
SERVICE CMNT-IMP: NORMAL

## 2025-01-29 ENCOUNTER — APPOINTMENT (OUTPATIENT)
Facility: HOSPITAL | Age: 50
End: 2025-01-29
Payer: COMMERCIAL

## 2025-01-29 ENCOUNTER — HOSPITAL ENCOUNTER (EMERGENCY)
Facility: HOSPITAL | Age: 50
Discharge: LEFT BEFORE DISCHARGE | End: 2025-01-29
Attending: EMERGENCY MEDICINE
Payer: COMMERCIAL

## 2025-01-29 VITALS
RESPIRATION RATE: 35 BRPM | SYSTOLIC BLOOD PRESSURE: 157 MMHG | HEART RATE: 91 BPM | HEIGHT: 71 IN | OXYGEN SATURATION: 97 % | DIASTOLIC BLOOD PRESSURE: 79 MMHG | BODY MASS INDEX: 23.1 KG/M2 | WEIGHT: 165 LBS | TEMPERATURE: 101.1 F

## 2025-01-29 DIAGNOSIS — J96.01 ACUTE HYPOXIC RESPIRATORY FAILURE: Primary | ICD-10-CM

## 2025-01-29 DIAGNOSIS — J10.1 INFLUENZA A: ICD-10-CM

## 2025-01-29 LAB
ALBUMIN SERPL-MCNC: 2.2 G/DL (ref 3.5–5)
ALBUMIN/GLOB SERPL: 0.8 (ref 1.1–2.2)
ALP SERPL-CCNC: 108 U/L (ref 45–117)
ALT SERPL-CCNC: 21 U/L (ref 12–78)
ANION GAP SERPL CALC-SCNC: 8 MMOL/L (ref 2–12)
AST SERPL-CCNC: 33 U/L (ref 15–37)
BASOPHILS # BLD: 0.02 K/UL (ref 0–0.1)
BASOPHILS NFR BLD: 0.2 % (ref 0–1)
BILIRUB SERPL-MCNC: 0.3 MG/DL (ref 0.2–1)
BUN SERPL-MCNC: 19 MG/DL (ref 6–20)
BUN/CREAT SERPL: 13 (ref 12–20)
CALCIUM SERPL-MCNC: 7.8 MG/DL (ref 8.5–10.1)
CHLORIDE SERPL-SCNC: 95 MMOL/L (ref 97–108)
CO2 SERPL-SCNC: 30 MMOL/L (ref 21–32)
CREAT SERPL-MCNC: 1.5 MG/DL (ref 0.7–1.3)
DIFFERENTIAL METHOD BLD: ABNORMAL
EOSINOPHIL # BLD: 0 K/UL (ref 0–0.4)
EOSINOPHIL NFR BLD: 0 % (ref 0–7)
ERYTHROCYTE [DISTWIDTH] IN BLOOD BY AUTOMATED COUNT: 12.4 % (ref 11.5–14.5)
FLUAV RNA SPEC QL NAA+PROBE: DETECTED
FLUBV RNA SPEC QL NAA+PROBE: NOT DETECTED
GLOBULIN SER CALC-MCNC: 2.9 G/DL (ref 2–4)
GLUCOSE BLD STRIP.AUTO-MCNC: 286 MG/DL (ref 65–117)
GLUCOSE SERPL-MCNC: 288 MG/DL (ref 65–100)
HCT VFR BLD AUTO: 41.8 % (ref 36.6–50.3)
HGB BLD-MCNC: 14.3 G/DL (ref 12.1–17)
IMM GRANULOCYTES # BLD AUTO: 0.04 K/UL (ref 0–0.04)
IMM GRANULOCYTES NFR BLD AUTO: 0.4 % (ref 0–0.5)
LACTATE SERPL-SCNC: 1.1 MMOL/L (ref 0.4–2)
LYMPHOCYTES # BLD: 0.42 K/UL (ref 0.8–3.5)
LYMPHOCYTES NFR BLD: 3.8 % (ref 12–49)
MCH RBC QN AUTO: 30.4 PG (ref 26–34)
MCHC RBC AUTO-ENTMCNC: 34.2 G/DL (ref 30–36.5)
MCV RBC AUTO: 88.9 FL (ref 80–99)
MONOCYTES # BLD: 0.6 K/UL (ref 0–1)
MONOCYTES NFR BLD: 5.4 % (ref 5–13)
NEUTS SEG # BLD: 10.02 K/UL (ref 1.8–8)
NEUTS SEG NFR BLD: 90.2 % (ref 32–75)
NRBC # BLD: 0 K/UL (ref 0–0.01)
NRBC BLD-RTO: 0 PER 100 WBC
PLATELET # BLD AUTO: 153 K/UL (ref 150–400)
PLATELET COMMENT: ABNORMAL
PMV BLD AUTO: 10 FL (ref 8.9–12.9)
POTASSIUM SERPL-SCNC: 3.3 MMOL/L (ref 3.5–5.1)
PROT SERPL-MCNC: 5.1 G/DL (ref 6.4–8.2)
RBC # BLD AUTO: 4.7 M/UL (ref 4.1–5.7)
RBC MORPH BLD: ABNORMAL
SARS-COV-2 RNA RESP QL NAA+PROBE: NOT DETECTED
SERVICE CMNT-IMP: ABNORMAL
SODIUM SERPL-SCNC: 133 MMOL/L (ref 136–145)
SOURCE: ABNORMAL
TROPONIN I SERPL HS-MCNC: 74 NG/L (ref 0–76)
WBC # BLD AUTO: 11.1 K/UL (ref 4.1–11.1)

## 2025-01-29 PROCEDURE — 80053 COMPREHEN METABOLIC PANEL: CPT

## 2025-01-29 PROCEDURE — 94664 DEMO&/EVAL PT USE INHALER: CPT

## 2025-01-29 PROCEDURE — 93005 ELECTROCARDIOGRAM TRACING: CPT | Performed by: EMERGENCY MEDICINE

## 2025-01-29 PROCEDURE — 36415 COLL VENOUS BLD VENIPUNCTURE: CPT

## 2025-01-29 PROCEDURE — 96374 THER/PROPH/DIAG INJ IV PUSH: CPT

## 2025-01-29 PROCEDURE — 87636 SARSCOV2 & INF A&B AMP PRB: CPT

## 2025-01-29 PROCEDURE — 84145 PROCALCITONIN (PCT): CPT

## 2025-01-29 PROCEDURE — 94640 AIRWAY INHALATION TREATMENT: CPT

## 2025-01-29 PROCEDURE — 71045 X-RAY EXAM CHEST 1 VIEW: CPT

## 2025-01-29 PROCEDURE — 6370000000 HC RX 637 (ALT 250 FOR IP): Performed by: EMERGENCY MEDICINE

## 2025-01-29 PROCEDURE — 6360000002 HC RX W HCPCS: Performed by: EMERGENCY MEDICINE

## 2025-01-29 PROCEDURE — 87040 BLOOD CULTURE FOR BACTERIA: CPT

## 2025-01-29 PROCEDURE — 2580000003 HC RX 258: Performed by: EMERGENCY MEDICINE

## 2025-01-29 PROCEDURE — 6370000000 HC RX 637 (ALT 250 FOR IP)

## 2025-01-29 PROCEDURE — 96375 TX/PRO/DX INJ NEW DRUG ADDON: CPT

## 2025-01-29 PROCEDURE — 82962 GLUCOSE BLOOD TEST: CPT

## 2025-01-29 PROCEDURE — 85025 COMPLETE CBC W/AUTO DIFF WBC: CPT

## 2025-01-29 PROCEDURE — 99285 EMERGENCY DEPT VISIT HI MDM: CPT

## 2025-01-29 PROCEDURE — 84484 ASSAY OF TROPONIN QUANT: CPT

## 2025-01-29 PROCEDURE — 83605 ASSAY OF LACTIC ACID: CPT

## 2025-01-29 RX ORDER — ONDANSETRON 4 MG/1
4 TABLET, ORALLY DISINTEGRATING ORAL 3 TIMES DAILY PRN
Qty: 21 TABLET | Refills: 0 | Status: SHIPPED | OUTPATIENT
Start: 2025-01-29

## 2025-01-29 RX ORDER — AMLODIPINE BESYLATE 5 MG/1
10 TABLET ORAL
Status: COMPLETED | OUTPATIENT
Start: 2025-01-29 | End: 2025-01-29

## 2025-01-29 RX ORDER — CARVEDILOL 6.25 MG/1
6.25 TABLET ORAL 2 TIMES DAILY WITH MEALS
Status: DISCONTINUED | OUTPATIENT
Start: 2025-01-29 | End: 2025-01-29 | Stop reason: HOSPADM

## 2025-01-29 RX ORDER — PROCHLORPERAZINE EDISYLATE 5 MG/ML
10 INJECTION INTRAMUSCULAR; INTRAVENOUS ONCE
Status: COMPLETED | OUTPATIENT
Start: 2025-01-29 | End: 2025-01-29

## 2025-01-29 RX ORDER — ALBUTEROL SULFATE 90 UG/1
2 INHALANT RESPIRATORY (INHALATION) EVERY 6 HOURS PRN
Qty: 18 G | Refills: 3 | Status: SHIPPED | OUTPATIENT
Start: 2025-01-29

## 2025-01-29 RX ORDER — ALBUTEROL SULFATE 90 UG/1
2 INHALANT RESPIRATORY (INHALATION)
Status: COMPLETED | OUTPATIENT
Start: 2025-01-29 | End: 2025-01-29

## 2025-01-29 RX ORDER — DIPHENHYDRAMINE HYDROCHLORIDE 50 MG/ML
25 INJECTION INTRAMUSCULAR; INTRAVENOUS
Status: COMPLETED | OUTPATIENT
Start: 2025-01-29 | End: 2025-01-29

## 2025-01-29 RX ORDER — IPRATROPIUM BROMIDE AND ALBUTEROL SULFATE 2.5; .5 MG/3ML; MG/3ML
1 SOLUTION RESPIRATORY (INHALATION)
Status: COMPLETED | OUTPATIENT
Start: 2025-01-29 | End: 2025-01-29

## 2025-01-29 RX ORDER — BUTALBITAL, ACETAMINOPHEN AND CAFFEINE 50; 325; 40 MG/1; MG/1; MG/1
1 TABLET ORAL EVERY 6 HOURS PRN
Qty: 20 TABLET | Refills: 0 | Status: SHIPPED | OUTPATIENT
Start: 2025-01-29

## 2025-01-29 RX ORDER — ACETAMINOPHEN 500 MG
1000 TABLET ORAL
Status: COMPLETED | OUTPATIENT
Start: 2025-01-29 | End: 2025-01-29

## 2025-01-29 RX ORDER — 0.9 % SODIUM CHLORIDE 0.9 %
1000 INTRAVENOUS SOLUTION INTRAVENOUS ONCE
Status: COMPLETED | OUTPATIENT
Start: 2025-01-29 | End: 2025-01-29

## 2025-01-29 RX ORDER — LISINOPRIL 20 MG/1
40 TABLET ORAL DAILY
Status: DISCONTINUED | OUTPATIENT
Start: 2025-01-29 | End: 2025-01-29 | Stop reason: HOSPADM

## 2025-01-29 RX ORDER — DEXTROMETHORPHAN HYDROBROMIDE AND PROMETHAZINE HYDROCHLORIDE 15; 6.25 MG/5ML; MG/5ML
5 SYRUP ORAL 4 TIMES DAILY PRN
Qty: 118 ML | Refills: 0 | Status: SHIPPED | OUTPATIENT
Start: 2025-01-29 | End: 2025-02-05

## 2025-01-29 RX ORDER — KETOROLAC TROMETHAMINE 15 MG/ML
15 INJECTION, SOLUTION INTRAMUSCULAR; INTRAVENOUS
Status: COMPLETED | OUTPATIENT
Start: 2025-01-29 | End: 2025-01-29

## 2025-01-29 RX ADMIN — KETOROLAC TROMETHAMINE 15 MG: 15 INJECTION, SOLUTION INTRAMUSCULAR; INTRAVENOUS at 11:11

## 2025-01-29 RX ADMIN — LISINOPRIL 40 MG: 20 TABLET ORAL at 12:03

## 2025-01-29 RX ADMIN — IPRATROPIUM BROMIDE AND ALBUTEROL SULFATE 1 DOSE: .5; 2.5 SOLUTION RESPIRATORY (INHALATION) at 11:44

## 2025-01-29 RX ADMIN — ALBUTEROL SULFATE 2 PUFF: 90 AEROSOL, METERED RESPIRATORY (INHALATION) at 13:12

## 2025-01-29 RX ADMIN — AMLODIPINE BESYLATE 10 MG: 5 TABLET ORAL at 12:03

## 2025-01-29 RX ADMIN — PROCHLORPERAZINE EDISYLATE 10 MG: 5 INJECTION INTRAMUSCULAR; INTRAVENOUS at 12:27

## 2025-01-29 RX ADMIN — CARVEDILOL 6.25 MG: 6.25 TABLET, FILM COATED ORAL at 12:03

## 2025-01-29 RX ADMIN — DIPHENHYDRAMINE HYDROCHLORIDE 25 MG: 50 INJECTION INTRAMUSCULAR; INTRAVENOUS at 12:25

## 2025-01-29 RX ADMIN — SODIUM CHLORIDE 1000 ML: 9 INJECTION, SOLUTION INTRAVENOUS at 11:09

## 2025-01-29 RX ADMIN — ACETAMINOPHEN 1000 MG: 500 TABLET, FILM COATED ORAL at 11:09

## 2025-01-29 ASSESSMENT — PAIN SCALES - GENERAL
PAINLEVEL_OUTOF10: 10
PAINLEVEL_OUTOF10: 0
PAINLEVEL_OUTOF10: 10
PAINLEVEL_OUTOF10: 10

## 2025-01-29 ASSESSMENT — PAIN - FUNCTIONAL ASSESSMENT
PAIN_FUNCTIONAL_ASSESSMENT: 0-10
PAIN_FUNCTIONAL_ASSESSMENT: 0-10

## 2025-01-29 NOTE — ED NOTES
Patient has not voided since arrival.  Patient is leaving AMA and Dr Roberts has explained the risks and benefits.  Patient aware that he is to return if not improving.

## 2025-01-29 NOTE — H&P
[facility]   Admission History & Physical        1/29/2025 12:06 PM  Patient: Michael Ledbetter 1975  PCP: Ricky Galvez MD    HISTORY  Chief Complaint/Reason for admission:   Chief Complaint   Patient presents with    Fever     Subjective:  Flulike symptoms: Generally fever body aches low oxygen    HPI: 49 y.o. male presented with the above chief complaint.      ED E/M: Hypoxic 80% requiring 4 L to 96.  Respiratory rate 21-29, temperature 103, pulse 110.  Potassium 3.3, sodium 133, creatinine 1.5 above baseline, glucose 288, lactate 1.1 white count 11.  Flu a positive blood cultures obtained.  Chest x-ray unremarkable.  Given nebulization.      Medical history is diabetes, hypertension,  ED evaluation 10/16/2024 tachycardic, hypertensive, refused admission looks like it may have been related to nonspecific colitis.      Pertinent past medical/surgical/social and family history were reviewed by myself and incorporated into my decision making as appropriate    Past Medical History:  Past Medical History:   Diagnosis Date    Anxiety     Diabetes ([facility])     HPV in male     Hypertension     Insomnia     Sinus problem     Stress     Type 2 diabetes mellitus without complication, without long-term current use of insulin ([facility]) 2/28/2017    Type 2 diabetes with nephropathy ([facility]) 3/29/2018       Past Surgical History:  History reviewed. No pertinent surgical history.    Medication:  Prior to Admission medications    Medication Sig Start Date End Date Taking? Authorizing Provider   ondansetron (ZOFRAN-ODT) 4 MG disintegrating tablet Take 1 tablet by mouth 3 times daily as needed for Nausea or Vomiting 10/16/24   COCO Navarro MD   Lancets MISC To check blood sugars 1-2 times daily. Diagnosis: Diabetes mellitus type 2. 4/22/19   Automatic Reconciliation, Ar   acetaminophen (TYLENOL) 500 MG tablet Take 1,000 mg by mouth 2/17/21   Automatic Reconciliation, Ar   amLODIPine (NORVASC) 10 MG tablet

## 2025-01-29 NOTE — ED NOTES
0ff oxygen and 02 saturations dropping to 88-90% patient is aware and states he feels better Headache 7/10 with cough 02 saturations go to 90% on room air.

## 2025-01-29 NOTE — ED NOTES
Patient has not been taking his medications due to not feeling well.  Given oral medications. C/o headache has had before but worse at present asking for something winter for his headaches

## 2025-01-29 NOTE — ED TRIAGE NOTES
Pt presents POV with c/o flu like symptoms. Fever, body aches, nausea. Pt requiring oxygen during triage and is febrile and weak.

## 2025-01-29 NOTE — ED NOTES
I have reviewed discharge instructions with the patient and spouse. The patient and spouse verbalized understanding. Discharge medications discussed with patient. No questions at this time. Ambulated without difficulty. 02 saturations on room air currently at 97%.  RT here to instruct on how to use the inhaler.

## 2025-01-29 NOTE — ED PROVIDER NOTES
[facility] EMERGENCY DEPARTMENT  EMERGENCY DEPARTMENT ENCOUNTER       Pt Name: Michael Ledbetter  MRN: [id number]  Birthdate 1975  Date of evaluation: 1/29/2025  Provider: Presley Roberts DO   PCP: Ricky Galvez MD  Note Started: 12:06 PM EST 1/29/25     CHIEF COMPLAINT       Chief Complaint   Patient presents with    Fever        HISTORY OF PRESENT ILLNESS: 1 or more elements      History From: Patient/spouse, History limited by: none     Michael Ledbetter is a 49 y.o. male presents to the emergency department by private vehicle for evaluation of flulike symptoms       Please See MDM for Additional Details of the HPI/PMH  Nursing Notes were all reviewed and agreed with or any disagreements were addressed in the HPI.     REVIEW OF SYSTEMS        Positives and Pertinent negatives as per HPI.    PAST HISTORY     Past Medical History:  Past Medical History:   Diagnosis Date    Anxiety     Diabetes (HCC)     HPV in male     Hypertension     Insomnia     Sinus problem     Stress     Type 2 diabetes mellitus without complication, without long-term current use of insulin ([facility]) 2/28/2017    Type 2 diabetes with nephropathy ([facility]) 3/29/2018       Past Surgical History:  History reviewed. No pertinent surgical history.    Family History:  Family History   Problem Relation Age of Onset    Heart Disease Father     Suicide Brother        Social History:  Social History     Tobacco Use    Smoking status: Every Day     Current packs/day: 1.00     Types: Cigarettes    Smokeless tobacco: Never   Substance Use Topics    Alcohol use: No    Drug use: No       Allergies:  No Known Allergies    CURRENT MEDICATIONS      Previous Medications    ACETAMINOPHEN (TYLENOL) 500 MG TABLET    Take 1,000 mg by mouth    AMLODIPINE (NORVASC) 10 MG TABLET    TAKE 1 TABLET BY MOUTH DAILY FOR PRESSURE    CETIRIZINE (ZYRTEC) 10 MG TABLET    Take 1 tablet by mouth daily    CHLORTHALIDONE (HYGROTON) 25 MG TABLET    Take 0.5 tablets by mouth daily

## 2025-01-30 ENCOUNTER — APPOINTMENT (OUTPATIENT)
Facility: HOSPITAL | Age: 50
DRG: 189 | End: 2025-01-30
Payer: COMMERCIAL

## 2025-01-30 ENCOUNTER — HOSPITAL ENCOUNTER (INPATIENT)
Facility: HOSPITAL | Age: 50
LOS: 1 days | Discharge: HOME | DRG: 189 | End: 2025-02-01
Attending: EMERGENCY MEDICINE | Admitting: HOSPITALIST
Payer: COMMERCIAL

## 2025-01-30 DIAGNOSIS — J10.1 INFLUENZA A: ICD-10-CM

## 2025-01-30 DIAGNOSIS — N17.9 AKI (ACUTE KIDNEY INJURY) (HCC): Primary | ICD-10-CM

## 2025-01-30 DIAGNOSIS — J18.9 PNEUMONIA OF BOTH LUNGS DUE TO INFECTIOUS ORGANISM, UNSPECIFIED PART OF LUNG: ICD-10-CM

## 2025-01-30 DIAGNOSIS — J96.01 ACUTE RESPIRATORY FAILURE WITH HYPOXIA: ICD-10-CM

## 2025-01-30 DIAGNOSIS — R06.02 SHORTNESS OF BREATH: ICD-10-CM

## 2025-01-30 LAB
ALBUMIN SERPL-MCNC: 1.7 G/DL (ref 3.5–5)
ALBUMIN/GLOB SERPL: 0.5 (ref 1.1–2.2)
ALP SERPL-CCNC: 89 U/L (ref 45–117)
ALT SERPL-CCNC: 21 U/L (ref 12–78)
ANION GAP SERPL CALC-SCNC: 11 MMOL/L (ref 2–12)
AST SERPL-CCNC: 30 U/L (ref 15–37)
BASE EXCESS BLDV CALC-SCNC: 3.9 MMOL/L
BASOPHILS # BLD: 0 K/UL (ref 0–0.1)
BASOPHILS NFR BLD: 0 % (ref 0–1)
BDY SITE: ABNORMAL
BILIRUB SERPL-MCNC: 0.3 MG/DL (ref 0.2–1)
BUN SERPL-MCNC: 41 MG/DL (ref 6–20)
BUN/CREAT SERPL: 16 (ref 12–20)
CALCIUM SERPL-MCNC: 7.8 MG/DL (ref 8.5–10.1)
CHLORIDE SERPL-SCNC: 90 MMOL/L (ref 97–108)
CO2 SERPL-SCNC: 29 MMOL/L (ref 21–32)
CREAT SERPL-MCNC: 2.62 MG/DL (ref 0.7–1.3)
DIFFERENTIAL METHOD BLD: NORMAL
EOSINOPHIL # BLD: 0 K/UL (ref 0–0.4)
EOSINOPHIL NFR BLD: 0 % (ref 0–7)
ERYTHROCYTE [DISTWIDTH] IN BLOOD BY AUTOMATED COUNT: 12.7 % (ref 11.5–14.5)
FIO2 ON VENT: 75 %
GAS FLOW.O2 O2 DELIVERY SYS: 30 L/MIN
GLOBULIN SER CALC-MCNC: 3.2 G/DL (ref 2–4)
GLUCOSE SERPL-MCNC: 359 MG/DL (ref 65–100)
HCO3 BLDV-SCNC: 29 MMOL/L (ref 23–28)
HCT VFR BLD AUTO: 42.9 % (ref 36.6–50.3)
HGB BLD-MCNC: 14.8 G/DL (ref 12.1–17)
IMM GRANULOCYTES # BLD AUTO: 0 K/UL (ref 0–0.04)
IMM GRANULOCYTES NFR BLD AUTO: 0 % (ref 0–0.5)
LACTATE SERPL-SCNC: 1.5 MMOL/L (ref 0.4–2)
LYMPHOCYTES # BLD: 1.22 K/UL (ref 0.8–3.5)
LYMPHOCYTES NFR BLD: 14 % (ref 12–49)
MAGNESIUM SERPL-MCNC: 1.7 MG/DL (ref 1.6–2.4)
MCH RBC QN AUTO: 30.4 PG (ref 26–34)
MCHC RBC AUTO-ENTMCNC: 34.5 G/DL (ref 30–36.5)
MCV RBC AUTO: 88.1 FL (ref 80–99)
MONOCYTES # BLD: 0.44 K/UL (ref 0–1)
MONOCYTES NFR BLD: 5 % (ref 5–13)
NEUTS BAND NFR BLD MANUAL: 32 %
NEUTS SEG # BLD: 7.04 K/UL (ref 1.8–8)
NEUTS SEG NFR BLD: 49 % (ref 32–75)
NRBC # BLD: 0 K/UL (ref 0–0.01)
NRBC BLD-RTO: 0 PER 100 WBC
NT PRO BNP: 5390 PG/ML (ref 0–125)
PCO2 BLDV: 42.7 MMHG (ref 41–51)
PH BLDV: 7.44 (ref 7.32–7.42)
PLATELET # BLD AUTO: 150 K/UL (ref 150–400)
PLATELET COMMENT: NORMAL
PMV BLD AUTO: 10.6 FL (ref 8.9–12.9)
PO2 BLDV: 69 MMHG (ref 25–40)
POTASSIUM SERPL-SCNC: 4.1 MMOL/L (ref 3.5–5.1)
PROCALCITONIN SERPL-MCNC: 0.89 NG/ML
PROT SERPL-MCNC: 4.9 G/DL (ref 6.4–8.2)
RBC # BLD AUTO: 4.87 M/UL (ref 4.1–5.7)
RBC MORPH BLD: NORMAL
SAO2% DEVICE SAO2% SENSOR NAME: ABNORMAL
SODIUM SERPL-SCNC: 130 MMOL/L (ref 136–145)
SPECIMEN SITE: ABNORMAL
TROPONIN I SERPL HS-MCNC: 32 NG/L (ref 0–76)
WBC # BLD AUTO: 8.7 K/UL (ref 4.1–11.1)

## 2025-01-30 PROCEDURE — 83605 ASSAY OF LACTIC ACID: CPT

## 2025-01-30 PROCEDURE — 96375 TX/PRO/DX INJ NEW DRUG ADDON: CPT

## 2025-01-30 PROCEDURE — 5A0945A ASSISTANCE WITH RESPIRATORY VENTILATION, 24-96 CONSECUTIVE HOURS, HIGH NASAL FLOW/VELOCITY: ICD-10-PCS | Performed by: INTERNAL MEDICINE

## 2025-01-30 PROCEDURE — 83880 ASSAY OF NATRIURETIC PEPTIDE: CPT

## 2025-01-30 PROCEDURE — 6370000000 HC RX 637 (ALT 250 FOR IP): Performed by: EMERGENCY MEDICINE

## 2025-01-30 PROCEDURE — 80053 COMPREHEN METABOLIC PANEL: CPT

## 2025-01-30 PROCEDURE — 2500000003 HC RX 250 WO HCPCS: Performed by: EMERGENCY MEDICINE

## 2025-01-30 PROCEDURE — 84484 ASSAY OF TROPONIN QUANT: CPT

## 2025-01-30 PROCEDURE — 83735 ASSAY OF MAGNESIUM: CPT

## 2025-01-30 PROCEDURE — 2580000003 HC RX 258: Performed by: EMERGENCY MEDICINE

## 2025-01-30 PROCEDURE — 36415 COLL VENOUS BLD VENIPUNCTURE: CPT

## 2025-01-30 PROCEDURE — 96365 THER/PROPH/DIAG IV INF INIT: CPT

## 2025-01-30 PROCEDURE — 82803 BLOOD GASES ANY COMBINATION: CPT

## 2025-01-30 PROCEDURE — 94761 N-INVAS EAR/PLS OXIMETRY MLT: CPT

## 2025-01-30 PROCEDURE — 85025 COMPLETE CBC W/AUTO DIFF WBC: CPT

## 2025-01-30 PROCEDURE — 87040 BLOOD CULTURE FOR BACTERIA: CPT

## 2025-01-30 PROCEDURE — 6360000002 HC RX W HCPCS: Performed by: EMERGENCY MEDICINE

## 2025-01-30 PROCEDURE — 71250 CT THORAX DX C-: CPT

## 2025-01-30 PROCEDURE — 99285 EMERGENCY DEPT VISIT HI MDM: CPT

## 2025-01-30 PROCEDURE — 71045 X-RAY EXAM CHEST 1 VIEW: CPT

## 2025-01-30 RX ORDER — 0.9 % SODIUM CHLORIDE 0.9 %
1000 INTRAVENOUS SOLUTION INTRAVENOUS ONCE
Status: COMPLETED | OUTPATIENT
Start: 2025-01-30 | End: 2025-01-30

## 2025-01-30 RX ORDER — ONDANSETRON 2 MG/ML
4 INJECTION INTRAMUSCULAR; INTRAVENOUS ONCE
Status: COMPLETED | OUTPATIENT
Start: 2025-01-30 | End: 2025-01-30

## 2025-01-30 RX ORDER — ACETAMINOPHEN 325 MG/1
650 TABLET ORAL
Status: COMPLETED | OUTPATIENT
Start: 2025-01-30 | End: 2025-01-30

## 2025-01-30 RX ORDER — IOPAMIDOL 755 MG/ML
100 INJECTION, SOLUTION INTRAVASCULAR
Status: DISCONTINUED | OUTPATIENT
Start: 2025-01-30 | End: 2025-01-30

## 2025-01-30 RX ORDER — 0.9 % SODIUM CHLORIDE 0.9 %
1000 INTRAVENOUS SOLUTION INTRAVENOUS ONCE
Status: COMPLETED | OUTPATIENT
Start: 2025-01-30 | End: 2025-01-31

## 2025-01-30 RX ORDER — OXYCODONE AND ACETAMINOPHEN 5; 325 MG/1; MG/1
1 TABLET ORAL
Status: DISCONTINUED | OUTPATIENT
Start: 2025-01-30 | End: 2025-01-30

## 2025-01-30 RX ORDER — MORPHINE SULFATE 2 MG/ML
2 INJECTION, SOLUTION INTRAMUSCULAR; INTRAVENOUS
Status: COMPLETED | OUTPATIENT
Start: 2025-01-30 | End: 2025-01-30

## 2025-01-30 RX ADMIN — MORPHINE SULFATE 2 MG: 2 INJECTION, SOLUTION INTRAMUSCULAR; INTRAVENOUS at 22:27

## 2025-01-30 RX ADMIN — ACETAMINOPHEN 650 MG: 325 TABLET ORAL at 22:27

## 2025-01-30 RX ADMIN — ONDANSETRON 4 MG: 2 INJECTION, SOLUTION INTRAMUSCULAR; INTRAVENOUS at 22:25

## 2025-01-30 RX ADMIN — SODIUM CHLORIDE 1000 ML: 9 INJECTION, SOLUTION INTRAVENOUS at 22:32

## 2025-01-30 RX ADMIN — WATER 1000 MG: 1 INJECTION INTRAMUSCULAR; INTRAVENOUS; SUBCUTANEOUS at 21:47

## 2025-01-30 RX ADMIN — AZITHROMYCIN MONOHYDRATE 500 MG: 500 INJECTION, POWDER, LYOPHILIZED, FOR SOLUTION INTRAVENOUS at 22:02

## 2025-01-30 ASSESSMENT — ENCOUNTER SYMPTOMS
SHORTNESS OF BREATH: 1
BACK PAIN: 1
DIARRHEA: 1
COUGH: 1

## 2025-01-30 ASSESSMENT — LIFESTYLE VARIABLES
HOW OFTEN DO YOU HAVE A DRINK CONTAINING ALCOHOL: MONTHLY OR LESS
HOW MANY STANDARD DRINKS CONTAINING ALCOHOL DO YOU HAVE ON A TYPICAL DAY: 1 OR 2

## 2025-01-30 ASSESSMENT — PAIN DESCRIPTION - LOCATION: LOCATION: GENERALIZED

## 2025-01-30 ASSESSMENT — PAIN - FUNCTIONAL ASSESSMENT: PAIN_FUNCTIONAL_ASSESSMENT: 0-10

## 2025-01-31 ENCOUNTER — APPOINTMENT (OUTPATIENT)
Facility: HOSPITAL | Age: 50
DRG: 189 | End: 2025-01-31
Payer: COMMERCIAL

## 2025-01-31 PROBLEM — J96.01 ACUTE HYPOXIC RESPIRATORY FAILURE: Status: ACTIVE | Noted: 2025-01-31

## 2025-01-31 LAB
ALBUMIN SERPL-MCNC: 1.2 G/DL (ref 3.5–5)
ALBUMIN/GLOB SERPL: 0.4 (ref 1.1–2.2)
ALP SERPL-CCNC: 67 U/L (ref 45–117)
ALT SERPL-CCNC: 17 U/L (ref 12–78)
ANION GAP SERPL CALC-SCNC: 6 MMOL/L (ref 2–12)
AST SERPL-CCNC: 21 U/L (ref 15–37)
BASOPHILS # BLD: 0.01 K/UL (ref 0–0.1)
BASOPHILS NFR BLD: 0.1 % (ref 0–1)
BILIRUB SERPL-MCNC: 0.2 MG/DL (ref 0.2–1)
BUN SERPL-MCNC: 42 MG/DL (ref 6–20)
BUN/CREAT SERPL: 17 (ref 12–20)
CALCIUM SERPL-MCNC: 7.8 MG/DL (ref 8.5–10.1)
CHLORIDE SERPL-SCNC: 96 MMOL/L (ref 97–108)
CO2 SERPL-SCNC: 31 MMOL/L (ref 21–32)
CREAT SERPL-MCNC: 2.47 MG/DL (ref 0.7–1.3)
DIFFERENTIAL METHOD BLD: ABNORMAL
ECHO AO ROOT DIAM: 2.9 CM
ECHO AO ROOT INDEX: 1.49 CM/M2
ECHO AV PEAK GRADIENT: 3 MMHG
ECHO AV PEAK VELOCITY: 0.9 M/S
ECHO BSA: 1.93 M2
ECHO LA DIAMETER INDEX: 2.01 CM/M2
ECHO LA DIAMETER: 3.9 CM
ECHO LA TO AORTIC ROOT RATIO: 1.34
ECHO LA VOL A-L A2C: 70 ML (ref 18–58)
ECHO LA VOL A-L A4C: 54 ML (ref 18–58)
ECHO LA VOL MOD A2C: 69 ML (ref 18–58)
ECHO LA VOL MOD A4C: 51 ML (ref 18–58)
ECHO LA VOLUME AREA LENGTH: 65 ML
ECHO LA VOLUME INDEX A-L A2C: 36 ML/M2 (ref 16–34)
ECHO LA VOLUME INDEX A-L A4C: 28 ML/M2 (ref 16–34)
ECHO LA VOLUME INDEX AREA LENGTH: 34 ML/M2 (ref 16–34)
ECHO LA VOLUME INDEX MOD A2C: 36 ML/M2 (ref 16–34)
ECHO LA VOLUME INDEX MOD A4C: 26 ML/M2 (ref 16–34)
ECHO LV E' LATERAL VELOCITY: 7.36 CM/S
ECHO LV E' SEPTAL VELOCITY: 5.99 CM/S
ECHO LV EF PHYSICIAN: 55 %
ECHO LV FRACTIONAL SHORTENING: 26 % (ref 28–44)
ECHO LV INTERNAL DIMENSION DIASTOLE INDEX: 2.22 CM/M2
ECHO LV INTERNAL DIMENSION DIASTOLIC: 4.3 CM (ref 4.2–5.9)
ECHO LV INTERNAL DIMENSION SYSTOLIC INDEX: 1.65 CM/M2
ECHO LV INTERNAL DIMENSION SYSTOLIC: 3.2 CM
ECHO LV IVSD: 1 CM (ref 0.6–1)
ECHO LV MASS 2D: 173.6 G (ref 88–224)
ECHO LV MASS INDEX 2D: 89.5 G/M2 (ref 49–115)
ECHO LV POSTERIOR WALL DIASTOLIC: 1.3 CM (ref 0.6–1)
ECHO LV RELATIVE WALL THICKNESS RATIO: 0.6
ECHO LVOT AREA: 3.1 CM2
ECHO LVOT DIAM: 2 CM
ECHO MV A VELOCITY: 0.89 M/S
ECHO MV E DECELERATION TIME (DT): 214 MS
ECHO MV E VELOCITY: 0.74 M/S
ECHO MV E/A RATIO: 0.83
ECHO MV E/E' LATERAL: 10.05
ECHO MV E/E' RATIO (AVERAGED): 11.2
ECHO MV E/E' SEPTAL: 12.35
ECHO PV MAX VELOCITY: 0.7 M/S
ECHO PV PEAK GRADIENT: 2 MMHG
ECHO RA AREA 4C: 16.3 CM2
ECHO RV BASAL DIMENSION: 3.5 CM
ECHO RV FREE WALL PEAK S': 16.8 CM/S
ECHO RV TAPSE: 2.1 CM (ref 1.7–?)
EKG ATRIAL RATE: 108 BPM
EKG DIAGNOSIS: NORMAL
EKG P AXIS: 82 DEGREES
EKG P-R INTERVAL: 156 MS
EKG Q-T INTERVAL: 334 MS
EKG QRS DURATION: 82 MS
EKG QTC CALCULATION (BAZETT): 447 MS
EKG R AXIS: 41 DEGREES
EKG T AXIS: 77 DEGREES
EKG VENTRICULAR RATE: 108 BPM
EOSINOPHIL # BLD: 0 K/UL (ref 0–0.4)
EOSINOPHIL NFR BLD: 0 % (ref 0–7)
ERYTHROCYTE [DISTWIDTH] IN BLOOD BY AUTOMATED COUNT: 12.6 % (ref 11.5–14.5)
EST. AVERAGE GLUCOSE BLD GHB EST-MCNC: 192 MG/DL
GLOBULIN SER CALC-MCNC: 3.2 G/DL (ref 2–4)
GLUCOSE BLD STRIP.AUTO-MCNC: 182 MG/DL (ref 65–117)
GLUCOSE BLD STRIP.AUTO-MCNC: 207 MG/DL (ref 65–117)
GLUCOSE BLD STRIP.AUTO-MCNC: 232 MG/DL (ref 65–117)
GLUCOSE BLD STRIP.AUTO-MCNC: 259 MG/DL (ref 65–117)
GLUCOSE BLD STRIP.AUTO-MCNC: 321 MG/DL (ref 65–117)
GLUCOSE SERPL-MCNC: 205 MG/DL (ref 65–100)
HBA1C MFR BLD: 8.3 % (ref 4–5.6)
HCT VFR BLD AUTO: 36.4 % (ref 36.6–50.3)
HGB BLD-MCNC: 12.6 G/DL (ref 12.1–17)
IMM GRANULOCYTES # BLD AUTO: 0.02 K/UL (ref 0–0.04)
IMM GRANULOCYTES NFR BLD AUTO: 0.3 % (ref 0–0.5)
LYMPHOCYTES # BLD: 0.57 K/UL (ref 0.8–3.5)
LYMPHOCYTES NFR BLD: 7.3 % (ref 12–49)
MCH RBC QN AUTO: 30.3 PG (ref 26–34)
MCHC RBC AUTO-ENTMCNC: 34.6 G/DL (ref 30–36.5)
MCV RBC AUTO: 87.5 FL (ref 80–99)
MONOCYTES # BLD: 0.27 K/UL (ref 0–1)
MONOCYTES NFR BLD: 3.5 % (ref 5–13)
NEUTS SEG # BLD: 6.89 K/UL (ref 1.8–8)
NEUTS SEG NFR BLD: 88.8 % (ref 32–75)
NRBC # BLD: 0 K/UL (ref 0–0.01)
NRBC BLD-RTO: 0 PER 100 WBC
PLATELET # BLD AUTO: 131 K/UL (ref 150–400)
PMV BLD AUTO: 10.6 FL (ref 8.9–12.9)
POTASSIUM SERPL-SCNC: 3.8 MMOL/L (ref 3.5–5.1)
PROT SERPL-MCNC: 4.4 G/DL (ref 6.4–8.2)
RBC # BLD AUTO: 4.16 M/UL (ref 4.1–5.7)
SERVICE CMNT-IMP: ABNORMAL
SODIUM SERPL-SCNC: 133 MMOL/L (ref 136–145)
WBC # BLD AUTO: 7.8 K/UL (ref 4.1–11.1)

## 2025-01-31 PROCEDURE — 6360000002 HC RX W HCPCS: Performed by: HOSPITALIST

## 2025-01-31 PROCEDURE — 6370000000 HC RX 637 (ALT 250 FOR IP): Performed by: HOSPITALIST

## 2025-01-31 PROCEDURE — 80053 COMPREHEN METABOLIC PANEL: CPT

## 2025-01-31 PROCEDURE — 85025 COMPLETE CBC W/AUTO DIFF WBC: CPT

## 2025-01-31 PROCEDURE — 6360000002 HC RX W HCPCS: Performed by: INTERNAL MEDICINE

## 2025-01-31 PROCEDURE — 2580000003 HC RX 258: Performed by: HOSPITALIST

## 2025-01-31 PROCEDURE — 2500000003 HC RX 250 WO HCPCS: Performed by: HOSPITALIST

## 2025-01-31 PROCEDURE — 36415 COLL VENOUS BLD VENIPUNCTURE: CPT

## 2025-01-31 PROCEDURE — 94761 N-INVAS EAR/PLS OXIMETRY MLT: CPT

## 2025-01-31 PROCEDURE — 6370000000 HC RX 637 (ALT 250 FOR IP): Performed by: INTERNAL MEDICINE

## 2025-01-31 PROCEDURE — 2060000000 HC ICU INTERMEDIATE R&B

## 2025-01-31 PROCEDURE — 93306 TTE W/DOPPLER COMPLETE: CPT

## 2025-01-31 PROCEDURE — 2500000003 HC RX 250 WO HCPCS: Performed by: INTERNAL MEDICINE

## 2025-01-31 PROCEDURE — 87449 NOS EACH ORGANISM AG IA: CPT

## 2025-01-31 PROCEDURE — 2580000003 HC RX 258: Performed by: EMERGENCY MEDICINE

## 2025-01-31 PROCEDURE — 82962 GLUCOSE BLOOD TEST: CPT

## 2025-01-31 PROCEDURE — 2700000000 HC OXYGEN THERAPY PER DAY

## 2025-01-31 PROCEDURE — 83036 HEMOGLOBIN GLYCOSYLATED A1C: CPT

## 2025-01-31 PROCEDURE — 94640 AIRWAY INHALATION TREATMENT: CPT

## 2025-01-31 RX ORDER — POLYETHYLENE GLYCOL 3350 17 G
2 POWDER IN PACKET (EA) ORAL
Status: DISCONTINUED | OUTPATIENT
Start: 2025-01-31 | End: 2025-02-01 | Stop reason: HOSPADM

## 2025-01-31 RX ORDER — INSULIN GLARGINE 100 [IU]/ML
15 INJECTION, SOLUTION SUBCUTANEOUS 2 TIMES DAILY
Status: DISCONTINUED | OUTPATIENT
Start: 2025-01-31 | End: 2025-02-01 | Stop reason: HOSPADM

## 2025-01-31 RX ORDER — ENOXAPARIN SODIUM 100 MG/ML
40 INJECTION SUBCUTANEOUS DAILY
Status: DISCONTINUED | OUTPATIENT
Start: 2025-01-31 | End: 2025-02-01 | Stop reason: HOSPADM

## 2025-01-31 RX ORDER — POLYETHYLENE GLYCOL 3350 17 G/17G
17 POWDER, FOR SOLUTION ORAL DAILY PRN
Status: DISCONTINUED | OUTPATIENT
Start: 2025-01-31 | End: 2025-02-01 | Stop reason: HOSPADM

## 2025-01-31 RX ORDER — CHLORTHALIDONE 25 MG/1
12.5 TABLET ORAL DAILY
Status: DISCONTINUED | OUTPATIENT
Start: 2025-01-31 | End: 2025-02-01 | Stop reason: HOSPADM

## 2025-01-31 RX ORDER — ALBUTEROL SULFATE 0.83 MG/ML
2.5 SOLUTION RESPIRATORY (INHALATION)
Status: DISCONTINUED | OUTPATIENT
Start: 2025-01-31 | End: 2025-02-01 | Stop reason: HOSPADM

## 2025-01-31 RX ORDER — OXYCODONE HYDROCHLORIDE 10 MG/1
10 TABLET ORAL EVERY 8 HOURS PRN
Status: DISCONTINUED | OUTPATIENT
Start: 2025-01-31 | End: 2025-02-01 | Stop reason: HOSPADM

## 2025-01-31 RX ORDER — METHYLPREDNISOLONE SODIUM SUCCINATE 125 MG/2ML
60 INJECTION INTRAMUSCULAR; INTRAVENOUS EVERY 8 HOURS
Status: DISCONTINUED | OUTPATIENT
Start: 2025-01-31 | End: 2025-01-31

## 2025-01-31 RX ORDER — ACETAMINOPHEN 325 MG/1
650 TABLET ORAL EVERY 6 HOURS PRN
Status: DISCONTINUED | OUTPATIENT
Start: 2025-01-31 | End: 2025-02-01 | Stop reason: HOSPADM

## 2025-01-31 RX ORDER — OSELTAMIVIR PHOSPHATE 30 MG/1
30 CAPSULE ORAL 2 TIMES DAILY
Status: DISCONTINUED | OUTPATIENT
Start: 2025-01-31 | End: 2025-01-31

## 2025-01-31 RX ORDER — VENLAFAXINE HYDROCHLORIDE 75 MG/1
75 CAPSULE, EXTENDED RELEASE ORAL DAILY
Status: DISCONTINUED | OUTPATIENT
Start: 2025-01-31 | End: 2025-02-01 | Stop reason: HOSPADM

## 2025-01-31 RX ORDER — SODIUM CHLORIDE 0.9 % (FLUSH) 0.9 %
5-40 SYRINGE (ML) INJECTION EVERY 12 HOURS SCHEDULED
Status: DISCONTINUED | OUTPATIENT
Start: 2025-01-31 | End: 2025-02-01 | Stop reason: HOSPADM

## 2025-01-31 RX ORDER — ACETAMINOPHEN 650 MG/1
650 SUPPOSITORY RECTAL EVERY 6 HOURS PRN
Status: DISCONTINUED | OUTPATIENT
Start: 2025-01-31 | End: 2025-02-01 | Stop reason: HOSPADM

## 2025-01-31 RX ORDER — GLUCAGON 1 MG/ML
1 KIT INJECTION PRN
Status: DISCONTINUED | OUTPATIENT
Start: 2025-01-31 | End: 2025-02-01 | Stop reason: HOSPADM

## 2025-01-31 RX ORDER — GUAIFENESIN 600 MG/1
600 TABLET, EXTENDED RELEASE ORAL 2 TIMES DAILY
Status: DISCONTINUED | OUTPATIENT
Start: 2025-01-31 | End: 2025-02-01 | Stop reason: HOSPADM

## 2025-01-31 RX ORDER — PRAVASTATIN SODIUM 20 MG
10 TABLET ORAL NIGHTLY
Status: DISCONTINUED | OUTPATIENT
Start: 2025-01-31 | End: 2025-02-01 | Stop reason: HOSPADM

## 2025-01-31 RX ORDER — DEXTROSE MONOHYDRATE 100 MG/ML
INJECTION, SOLUTION INTRAVENOUS CONTINUOUS PRN
Status: DISCONTINUED | OUTPATIENT
Start: 2025-01-31 | End: 2025-02-01 | Stop reason: HOSPADM

## 2025-01-31 RX ORDER — CLONIDINE 0.2 MG/24H
1 PATCH, EXTENDED RELEASE TRANSDERMAL WEEKLY
Status: DISCONTINUED | OUTPATIENT
Start: 2025-01-31 | End: 2025-02-01 | Stop reason: HOSPADM

## 2025-01-31 RX ORDER — SODIUM CHLORIDE 0.9 % (FLUSH) 0.9 %
5-40 SYRINGE (ML) INJECTION PRN
Status: DISCONTINUED | OUTPATIENT
Start: 2025-01-31 | End: 2025-02-01 | Stop reason: HOSPADM

## 2025-01-31 RX ORDER — ONDANSETRON 2 MG/ML
4 INJECTION INTRAMUSCULAR; INTRAVENOUS EVERY 6 HOURS PRN
Status: DISCONTINUED | OUTPATIENT
Start: 2025-01-31 | End: 2025-02-01 | Stop reason: HOSPADM

## 2025-01-31 RX ORDER — ONDANSETRON 4 MG/1
4 TABLET, ORALLY DISINTEGRATING ORAL EVERY 8 HOURS PRN
Status: DISCONTINUED | OUTPATIENT
Start: 2025-01-31 | End: 2025-02-01 | Stop reason: HOSPADM

## 2025-01-31 RX ORDER — WATER 10 ML/10ML
INJECTION INTRAMUSCULAR; INTRAVENOUS; SUBCUTANEOUS
Status: DISPENSED
Start: 2025-01-31 | End: 2025-01-31

## 2025-01-31 RX ORDER — INSULIN LISPRO 100 [IU]/ML
0-16 INJECTION, SOLUTION INTRAVENOUS; SUBCUTANEOUS
Status: DISCONTINUED | OUTPATIENT
Start: 2025-01-31 | End: 2025-02-01 | Stop reason: HOSPADM

## 2025-01-31 RX ORDER — OXYCODONE AND ACETAMINOPHEN 5; 325 MG/1; MG/1
2 TABLET ORAL EVERY 8 HOURS PRN
Status: DISCONTINUED | OUTPATIENT
Start: 2025-01-31 | End: 2025-01-31

## 2025-01-31 RX ORDER — IPRATROPIUM BROMIDE AND ALBUTEROL SULFATE 2.5; .5 MG/3ML; MG/3ML
1 SOLUTION RESPIRATORY (INHALATION)
Status: DISCONTINUED | OUTPATIENT
Start: 2025-02-01 | End: 2025-02-01 | Stop reason: HOSPADM

## 2025-01-31 RX ORDER — AMLODIPINE BESYLATE 10 MG/1
10 TABLET ORAL DAILY
Status: DISCONTINUED | OUTPATIENT
Start: 2025-01-31 | End: 2025-02-01 | Stop reason: HOSPADM

## 2025-01-31 RX ORDER — SODIUM CHLORIDE 9 MG/ML
INJECTION, SOLUTION INTRAVENOUS PRN
Status: DISCONTINUED | OUTPATIENT
Start: 2025-01-31 | End: 2025-02-01 | Stop reason: HOSPADM

## 2025-01-31 RX ORDER — MAGNESIUM HYDROXIDE/ALUMINUM HYDROXICE/SIMETHICONE 120; 1200; 1200 MG/30ML; MG/30ML; MG/30ML
30 SUSPENSION ORAL EVERY 6 HOURS PRN
Status: DISCONTINUED | OUTPATIENT
Start: 2025-01-31 | End: 2025-02-01 | Stop reason: HOSPADM

## 2025-01-31 RX ORDER — NICOTINE 21 MG/24HR
1 PATCH, TRANSDERMAL 24 HOURS TRANSDERMAL DAILY PRN
Status: DISCONTINUED | OUTPATIENT
Start: 2025-01-31 | End: 2025-02-01 | Stop reason: HOSPADM

## 2025-01-31 RX ORDER — OXYCODONE AND ACETAMINOPHEN 10; 325 MG/1; MG/1
1 TABLET ORAL EVERY 8 HOURS PRN
COMMUNITY

## 2025-01-31 RX ORDER — IPRATROPIUM BROMIDE AND ALBUTEROL SULFATE 2.5; .5 MG/3ML; MG/3ML
1 SOLUTION RESPIRATORY (INHALATION)
Status: DISCONTINUED | OUTPATIENT
Start: 2025-01-31 | End: 2025-01-31

## 2025-01-31 RX ORDER — OSELTAMIVIR PHOSPHATE 30 MG/1
30 CAPSULE ORAL 2 TIMES DAILY
Status: DISCONTINUED | OUTPATIENT
Start: 2025-01-31 | End: 2025-02-01 | Stop reason: HOSPADM

## 2025-01-31 RX ADMIN — IPRATROPIUM BROMIDE AND ALBUTEROL SULFATE 1 DOSE: 2.5; .5 SOLUTION RESPIRATORY (INHALATION) at 03:28

## 2025-01-31 RX ADMIN — GUAIFENESIN 600 MG: 600 TABLET ORAL at 21:55

## 2025-01-31 RX ADMIN — PRAVASTATIN SODIUM 10 MG: 20 TABLET ORAL at 21:52

## 2025-01-31 RX ADMIN — OSELTAMIVIR 30 MG: 30 CAPSULE ORAL at 08:52

## 2025-01-31 RX ADMIN — VENLAFAXINE HYDROCHLORIDE 75 MG: 75 CAPSULE, EXTENDED RELEASE ORAL at 08:53

## 2025-01-31 RX ADMIN — WATER 1000 MG: 1 INJECTION INTRAMUSCULAR; INTRAVENOUS; SUBCUTANEOUS at 21:52

## 2025-01-31 RX ADMIN — INSULIN LISPRO 4 UNITS: 100 INJECTION, SOLUTION INTRAVENOUS; SUBCUTANEOUS at 11:51

## 2025-01-31 RX ADMIN — GUAIFENESIN 600 MG: 600 TABLET ORAL at 08:52

## 2025-01-31 RX ADMIN — WATER 40 MG: 1 INJECTION INTRAMUSCULAR; INTRAVENOUS; SUBCUTANEOUS at 11:50

## 2025-01-31 RX ADMIN — IPRATROPIUM BROMIDE AND ALBUTEROL SULFATE 1 DOSE: 2.5; .5 SOLUTION RESPIRATORY (INHALATION) at 11:16

## 2025-01-31 RX ADMIN — WATER 40 MG: 1 INJECTION INTRAMUSCULAR; INTRAVENOUS; SUBCUTANEOUS at 21:51

## 2025-01-31 RX ADMIN — IPRATROPIUM BROMIDE AND ALBUTEROL SULFATE 1 DOSE: 2.5; .5 SOLUTION RESPIRATORY (INHALATION) at 08:09

## 2025-01-31 RX ADMIN — OSELTAMIVIR 30 MG: 30 CAPSULE ORAL at 21:51

## 2025-01-31 RX ADMIN — ENOXAPARIN SODIUM 40 MG: 100 INJECTION SUBCUTANEOUS at 08:51

## 2025-01-31 RX ADMIN — METHYLPREDNISOLONE SODIUM SUCCINATE 60 MG: 125 INJECTION INTRAMUSCULAR; INTRAVENOUS at 03:39

## 2025-01-31 RX ADMIN — AMLODIPINE BESYLATE 10 MG: 10 TABLET ORAL at 08:52

## 2025-01-31 RX ADMIN — IPRATROPIUM BROMIDE AND ALBUTEROL SULFATE 1 DOSE: 2.5; .5 SOLUTION RESPIRATORY (INHALATION) at 15:34

## 2025-01-31 RX ADMIN — SODIUM CHLORIDE, PRESERVATIVE FREE 20 ML: 5 INJECTION INTRAVENOUS at 11:15

## 2025-01-31 RX ADMIN — INSULIN GLARGINE 15 UNITS: 100 INJECTION, SOLUTION SUBCUTANEOUS at 21:52

## 2025-01-31 RX ADMIN — INSULIN LISPRO 8 UNITS: 100 INJECTION, SOLUTION INTRAVENOUS; SUBCUTANEOUS at 03:49

## 2025-01-31 RX ADMIN — OXYCODONE HYDROCHLORIDE 10 MG: 10 TABLET ORAL at 16:57

## 2025-01-31 RX ADMIN — AZITHROMYCIN MONOHYDRATE 500 MG: 500 INJECTION, POWDER, LYOPHILIZED, FOR SOLUTION INTRAVENOUS at 22:17

## 2025-01-31 RX ADMIN — INSULIN GLARGINE 15 UNITS: 100 INJECTION, SOLUTION SUBCUTANEOUS at 08:52

## 2025-01-31 RX ADMIN — IPRATROPIUM BROMIDE AND ALBUTEROL SULFATE 1 DOSE: 2.5; .5 SOLUTION RESPIRATORY (INHALATION) at 20:56

## 2025-01-31 RX ADMIN — SODIUM CHLORIDE, PRESERVATIVE FREE 10 ML: 5 INJECTION INTRAVENOUS at 21:55

## 2025-01-31 RX ADMIN — SODIUM CHLORIDE 1000 ML: 9 INJECTION, SOLUTION INTRAVENOUS at 00:07

## 2025-01-31 RX ADMIN — INSULIN LISPRO 4 UNITS: 100 INJECTION, SOLUTION INTRAVENOUS; SUBCUTANEOUS at 08:51

## 2025-01-31 RX ADMIN — IPRATROPIUM BROMIDE AND ALBUTEROL SULFATE 1 DOSE: 2.5; .5 SOLUTION RESPIRATORY (INHALATION) at 01:02

## 2025-01-31 ASSESSMENT — PAIN SCALES - GENERAL
PAINLEVEL_OUTOF10: 4
PAINLEVEL_OUTOF10: 6
PAINLEVEL_OUTOF10: 0
PAINLEVEL_OUTOF10: 0

## 2025-01-31 ASSESSMENT — PAIN DESCRIPTION - DESCRIPTORS
DESCRIPTORS: ACHING
DESCRIPTORS: ACHING

## 2025-01-31 ASSESSMENT — PAIN DESCRIPTION - ORIENTATION: ORIENTATION: LEFT

## 2025-01-31 ASSESSMENT — PAIN DESCRIPTION - LOCATION
LOCATION: BACK
LOCATION: BACK

## 2025-01-31 ASSESSMENT — PAIN DESCRIPTION - PAIN TYPE: TYPE: CHRONIC PAIN

## 2025-01-31 NOTE — H&P
Range    NT Pro-BNP 5,390 (H) 0 - 125 PG/ML   Comprehensive Metabolic Panel    Collection Time: 01/30/25  9:26 PM   Result Value Ref Range    Sodium 130 (L) 136 - 145 mmol/L    Potassium 4.1 3.5 - 5.1 mmol/L    Chloride 90 (L) 97 - 108 mmol/L    CO2 29 21 - 32 mmol/L    Anion Gap 11 2 - 12 mmol/L    Glucose 359 (H) 65 - 100 mg/dL    BUN 41 (H) 6 - 20 MG/DL    Creatinine 2.62 (H) 0.70 - 1.30 MG/DL    BUN/Creatinine Ratio 16 12 - 20      Est, Glom Filt Rate 29 (L) >60 ml/min/1.73m2    Calcium 7.8 (L) 8.5 - 10.1 MG/DL    Total Bilirubin 0.3 0.2 - 1.0 MG/DL    ALT 21 12 - 78 U/L    AST 30 15 - 37 U/L    Alk Phosphatase 89 45 - 117 U/L    Total Protein 4.9 (L) 6.4 - 8.2 g/dL    Albumin 1.7 (L) 3.5 - 5.0 g/dL    Globulin 3.2 2.0 - 4.0 g/dL    Albumin/Globulin Ratio 0.5 (L) 1.1 - 2.2     Blood Gas, Venous    Collection Time: 01/30/25 10:33 PM   Result Value Ref Range    pH, [place] 7.44 (H) 7.32 - 7.42      pCO2, [place] 42.7 41 - 51 mmHg    PO2, [place] 69 (H) 25 - 40 mmHg    HCO3, Venous 29 (H) 23 - 28 mmol/L    Base Excess, [place] 3.9 mmol/L    O2 Method High Flow Nasal Cannula      O2 Flow Rate 30.00 L/min    FIO2 Arterial 75 %    Source VENOUS BLOOD      Site RIGHT RADIAL     POCT Glucose    Collection Time: 01/31/25  3:38 AM   Result Value Ref Range    POC Glucose 259 (H) 65 - 117 mg/dL    Performed by: Galo Mayes          CT CHEST WO CONTRAST    Result Date: 1/31/2025  INDICATION: hypoxia eval pneumonia r/o PE COMPARISON: Chest radiograph January 30, 2025 TECHNIQUE:  Noncontrast 5 mm axial images were obtained through the chest. CT dose reduction was achieved through use of a standardized protocol tailored for this examination and automatic exposure control for dose modulation. FINDINGS: Note: Cannot assess for pulmonary embolus without contrast material. MEDIASTINUM/JOSÉ: Prominent pretracheal lymph nodes. HEART/PERICARDIUM: No cardiomegaly. Small pericardial effusion.  Coronary artery calcifications: Present LUNGS/PLEURA:

## 2025-01-31 NOTE — ED NOTES
This writer verified as a dual verification that based on the sliding scale insulin this patient received 8 units of insulin sub q. The amount drawn up by COCO Abernathy RN was correct.

## 2025-01-31 NOTE — ED NOTES
Admission SBAR Note  Situation/Background:     Patient is being transferred to Med/surg ([facility]), Room# 136    Patient's Chief Complaint was SOB/Influenza and is admitted for QAMAR, pneumonia bilateral, influenza, acute respiratory failure with hypoxia.    CODE STATUS: Full  CSSRS: 0 - No Risk    ISOLATION/PRECAUTIONS: Yes  ISOLATION TYPE: Droplet    Is this a behavioral health patient? No  Has wanding been completed No  Are belongings secure? Yes    Called outstanding consults: No    STAT labs collected: Yes    Repeat Lactic Acid DUE? No  TIME DUE:     All STAT orders are complete: Yes    The following personal items will be sent with the patient during transfer to the floor:     All valuables: none       ASSESSMENT:    NEURO:   NIH SCORE: 0,1-4,5-15,15-20,21-42: 0   GALINA SWALLOW SCREEN COMPLETE: Yes  ORIENTATION LEVEL: ORIENTATION LEVEL: Person, Place, Time, and Situation  Cognition:  appropriate decision making  Speech: shows no evidence of impairment    Is patient impulsive? No  Is patient oriented? Yes  Do they follow commands? Yes  Is the patient ambulatory? Yes    FALL RISK? No  Interventions: Implemented/recommended environmental changes (remove hazards, lower bed, improve lighting, etc.)    RESPIRATORY:   Is patient on oxygen? Yes  Oxygen therapy: continuous    O2 rate: 25 L @ 75%    CARDIAC:   Is cardiac monitoring ordered? Yes    Last Rhythm: Rhythm including paccardio: Normal Sinus Rhythm   Patient to transfer with tele box on? Yes  Infusions: Meds; iv fluids: none  LINE ACCESS: 22G Peripheral IV , Antecubital and Left ,        /GI:   Continent Bowel/Bladder? Yes  Urinary Output:   Chronic or Acute:   If Chronic, is it 3 days old, was it changed prior to specimen collection? Yes  Was UA with reflex sent to lab? Yes  If no, collect and send prior to transport to inpatient area.    INTEGUMENTARY:  IS THE PATIENT UNDRESSED? Yes  ARE THERE

## 2025-01-31 NOTE — ED PROVIDER NOTES
Unknown (2/2/2023)    [facility]    Social Connection and Isolation Panel [NHANES]     Frequency of Communication with Friends and Family: Patient declined     Frequency of Social Gatherings with Friends and Family: Patient declined     Attends Orthodoxy Services: Patient declined     Active Member of Clubs or Organizations: Patient declined     Attends Club or Organization Meetings: Patient declined     Marital Status:    Intimate Partner Violence: Not At Risk (2/2/2023)    [facility]    Humiliation, Afraid, Rape, and Kick questionnaire     Fear of Current or Ex-Partner: No     Emotionally Abused: No     Physically Abused: No     Sexually Abused: No   Depression: Not at risk (6/12/2024)    Received from [facility]    PHQ-2     PHQ-9 Total Score: 0   Housing Stability: Unknown (2/2/2023)    [facility]    Housing Stability Vital Sign     Unable to Pay for Housing in the Last Year: Patient refused     Number of Places Lived in the Last Year: Not on file     Unstable Housing in the Last Year: Patient refused   Interpersonal Safety: Not At Risk (1/30/2025)    Interpersonal Safety Domain Source: IP Abuse Screening     Physical abuse: Denies     Verbal abuse: Denies     Emotional abuse: Denies     Financial abuse: Denies     Sexual abuse: Denies   Utilities: Not on file     Social Connections: Unknown (2/2/2023)    [facility]    Social Connection and Isolation Panel [NHANES]     Frequency of Communication with Friends and Family: Patient declined     Frequency of Social Gatherings with Friends and Family: Patient declined     Attends Orthodoxy Services: Patient declined     Active Member of Clubs or Organizations: Patient declined     Attends Club or Organization Meetings: Patient declined

## 2025-01-31 NOTE — PROGRESS NOTES
[facility]  Hospitalist Progress Note  Nonbillable    NAME: Michael Ledbetter   :  1975   MRN:  [id number]     Total duration of encounter: 1 day    Admission HPI/Hospitalization Summary To Date:    Michael Ledbetter is a 49 y.o.  male with PMHx significant for insulin-dependent diabetes mellitus, hypertension, hyperlipidemia and tobacco use who presents to the emergency department complaining of cough and shortness of breath for the last several days.  Of note, he presented to the emergency department on 2025 where he was diagnosed with influenza A and noted to be hypoxic requiring 4 L of oxygen via nasal cannula.  He left the hospital AGAINST MEDICAL ADVICE at this time and returned after he continued to be shortness of breath and a cough.  He states that he smokes about a pack per day of cigarettes but does not have a formal diagnosis of COPD.  He also complains of subjective fever and chills, nausea and diarrhea.  He denies any chest pain, palpitations or lower extremity swelling.  He has no other complaints at this time.     In the emergency department, he was found to have a temperature of 100.5 °F and was hypoxic in the 70s requiring high flow oxygen via nasal cannula to maintain his oxygen saturations.  A CBC was unremarkable.  A CT MP was significant for a blood sugar of 359, BUN of 41 and creatinine of 2.6 compared to a baseline creatinine of 1.2.  BNP was 5400 and troponin was 32.  The CT chest without IV contrast was concerning for multifocal pneumonia.     He was given ceftriaxone and azithromycin in the emergency department and started on high flow nasal cannula.    Hospitalization, he has been maintained on high flow now improving.  Mental status hemodynamically has been stable            Subjective:     Reason for provider encounter today:    Follow-up E/M of hospitalized patient     Chief Complaint :    Reports feeling somewhat better  Review of Systems:    A full 10 point review

## 2025-02-01 VITALS
RESPIRATION RATE: 20 BRPM | WEIGHT: 164.2 LBS | BODY MASS INDEX: 22.99 KG/M2 | TEMPERATURE: 98.1 F | DIASTOLIC BLOOD PRESSURE: 99 MMHG | HEIGHT: 71 IN | HEART RATE: 94 BPM | SYSTOLIC BLOOD PRESSURE: 167 MMHG | OXYGEN SATURATION: 100 %

## 2025-02-01 PROBLEM — J09.X1 INFLUENZA A WITH PNEUMONIA: Status: ACTIVE | Noted: 2025-02-01

## 2025-02-01 LAB
ANION GAP SERPL CALC-SCNC: 10 MMOL/L (ref 2–12)
BACTERIA SPEC CULT: NORMAL
BACTERIA SPEC CULT: NORMAL
BASOPHILS # BLD: 0.01 K/UL (ref 0–0.1)
BASOPHILS NFR BLD: 0.1 % (ref 0–1)
BUN SERPL-MCNC: 45 MG/DL (ref 6–20)
BUN/CREAT SERPL: 21 (ref 12–20)
CALCIUM SERPL-MCNC: 8.3 MG/DL (ref 8.5–10.1)
CHLORIDE SERPL-SCNC: 96 MMOL/L (ref 97–108)
CO2 SERPL-SCNC: 28 MMOL/L (ref 21–32)
CREAT SERPL-MCNC: 2.1 MG/DL (ref 0.7–1.3)
DIFFERENTIAL METHOD BLD: ABNORMAL
EOSINOPHIL # BLD: 0 K/UL (ref 0–0.4)
EOSINOPHIL NFR BLD: 0 % (ref 0–7)
ERYTHROCYTE [DISTWIDTH] IN BLOOD BY AUTOMATED COUNT: 12.7 % (ref 11.5–14.5)
EST. AVERAGE GLUCOSE BLD GHB EST-MCNC: 197 MG/DL
GLUCOSE SERPL-MCNC: 289 MG/DL (ref 65–100)
HBA1C MFR BLD: 8.5 % (ref 4–5.6)
HCT VFR BLD AUTO: 38.2 % (ref 36.6–50.3)
HGB BLD-MCNC: 13.2 G/DL (ref 12.1–17)
IMM GRANULOCYTES # BLD AUTO: 0.02 K/UL (ref 0–0.04)
IMM GRANULOCYTES NFR BLD AUTO: 0.3 % (ref 0–0.5)
LYMPHOCYTES # BLD: 0.57 K/UL (ref 0.8–3.5)
LYMPHOCYTES NFR BLD: 7.9 % (ref 12–49)
MCH RBC QN AUTO: 30 PG (ref 26–34)
MCHC RBC AUTO-ENTMCNC: 34.6 G/DL (ref 30–36.5)
MCV RBC AUTO: 86.8 FL (ref 80–99)
MONOCYTES # BLD: 0.31 K/UL (ref 0–1)
MONOCYTES NFR BLD: 4.3 % (ref 5–13)
NEUTS SEG # BLD: 6.29 K/UL (ref 1.8–8)
NEUTS SEG NFR BLD: 87.4 % (ref 32–75)
NRBC # BLD: 0 K/UL (ref 0–0.01)
NRBC BLD-RTO: 0 PER 100 WBC
PLATELET # BLD AUTO: 181 K/UL (ref 150–400)
PLATELET COMMENT: ABNORMAL
PMV BLD AUTO: 10.5 FL (ref 8.9–12.9)
POTASSIUM SERPL-SCNC: 3.8 MMOL/L (ref 3.5–5.1)
RBC # BLD AUTO: 4.4 M/UL (ref 4.1–5.7)
RBC MORPH BLD: ABNORMAL
SERVICE CMNT-IMP: NORMAL
SODIUM SERPL-SCNC: 134 MMOL/L (ref 136–145)
WBC # BLD AUTO: 7.2 K/UL (ref 4.1–11.1)

## 2025-02-01 PROCEDURE — 94760 N-INVAS EAR/PLS OXIMETRY 1: CPT

## 2025-02-01 PROCEDURE — 6370000000 HC RX 637 (ALT 250 FOR IP): Performed by: INTERNAL MEDICINE

## 2025-02-01 PROCEDURE — 6370000000 HC RX 637 (ALT 250 FOR IP): Performed by: HOSPITALIST

## 2025-02-01 PROCEDURE — 2500000003 HC RX 250 WO HCPCS: Performed by: INTERNAL MEDICINE

## 2025-02-01 PROCEDURE — 2580000003 HC RX 258: Performed by: HOSPITALIST

## 2025-02-01 PROCEDURE — 94640 AIRWAY INHALATION TREATMENT: CPT

## 2025-02-01 PROCEDURE — 6360000002 HC RX W HCPCS: Performed by: HOSPITALIST

## 2025-02-01 PROCEDURE — 2500000003 HC RX 250 WO HCPCS: Performed by: HOSPITALIST

## 2025-02-01 PROCEDURE — 85025 COMPLETE CBC W/AUTO DIFF WBC: CPT

## 2025-02-01 PROCEDURE — 6360000002 HC RX W HCPCS: Performed by: INTERNAL MEDICINE

## 2025-02-01 PROCEDURE — 36415 COLL VENOUS BLD VENIPUNCTURE: CPT

## 2025-02-01 PROCEDURE — 80048 BASIC METABOLIC PNL TOTAL CA: CPT

## 2025-02-01 PROCEDURE — 83036 HEMOGLOBIN GLYCOSYLATED A1C: CPT

## 2025-02-01 PROCEDURE — 2700000000 HC OXYGEN THERAPY PER DAY

## 2025-02-01 RX ORDER — PREDNISONE 10 MG/1
TABLET ORAL
Qty: 9 TABLET | Refills: 0 | Status: SHIPPED | OUTPATIENT
Start: 2025-02-01 | End: 2025-02-07

## 2025-02-01 RX ORDER — NICOTINE 21 MG/24HR
1 PATCH, TRANSDERMAL 24 HOURS TRANSDERMAL DAILY PRN
Qty: 30 PATCH | Refills: 0 | Status: SHIPPED | OUTPATIENT
Start: 2025-02-01

## 2025-02-01 RX ORDER — POLYETHYLENE GLYCOL 3350 17 G
2 POWDER IN PACKET (EA) ORAL
Qty: 100 LOZENGE | Refills: 0 | Status: SHIPPED | OUTPATIENT
Start: 2025-02-01

## 2025-02-01 RX ORDER — HYDRALAZINE HYDROCHLORIDE 20 MG/ML
10 INJECTION INTRAMUSCULAR; INTRAVENOUS EVERY 6 HOURS PRN
Status: DISCONTINUED | OUTPATIENT
Start: 2025-02-01 | End: 2025-02-01 | Stop reason: HOSPADM

## 2025-02-01 RX ORDER — PREDNISONE 10 MG/1
10 TABLET ORAL
Status: DISCONTINUED | OUTPATIENT
Start: 2025-02-01 | End: 2025-02-01 | Stop reason: HOSPADM

## 2025-02-01 RX ORDER — OSELTAMIVIR PHOSPHATE 30 MG/1
30 CAPSULE ORAL 2 TIMES DAILY
Qty: 7 CAPSULE | Refills: 0 | Status: SHIPPED | OUTPATIENT
Start: 2025-02-01 | End: 2025-02-05

## 2025-02-01 RX ADMIN — AZITHROMYCIN MONOHYDRATE 500 MG: 500 INJECTION, POWDER, LYOPHILIZED, FOR SOLUTION INTRAVENOUS at 11:37

## 2025-02-01 RX ADMIN — WATER 40 MG: 1 INJECTION INTRAMUSCULAR; INTRAVENOUS; SUBCUTANEOUS at 04:06

## 2025-02-01 RX ADMIN — OXYCODONE HYDROCHLORIDE 10 MG: 10 TABLET ORAL at 09:39

## 2025-02-01 RX ADMIN — ENOXAPARIN SODIUM 40 MG: 100 INJECTION SUBCUTANEOUS at 09:40

## 2025-02-01 RX ADMIN — GUAIFENESIN 600 MG: 600 TABLET ORAL at 09:40

## 2025-02-01 RX ADMIN — AMLODIPINE BESYLATE 10 MG: 10 TABLET ORAL at 09:40

## 2025-02-01 RX ADMIN — OSELTAMIVIR 30 MG: 30 CAPSULE ORAL at 09:39

## 2025-02-01 RX ADMIN — HYDRALAZINE HYDROCHLORIDE 10 MG: 20 INJECTION INTRAMUSCULAR; INTRAVENOUS at 06:03

## 2025-02-01 RX ADMIN — IPRATROPIUM BROMIDE AND ALBUTEROL SULFATE 1 DOSE: .5; 2.5 SOLUTION RESPIRATORY (INHALATION) at 09:20

## 2025-02-01 RX ADMIN — VENLAFAXINE HYDROCHLORIDE 75 MG: 75 CAPSULE, EXTENDED RELEASE ORAL at 09:40

## 2025-02-01 RX ADMIN — INSULIN GLARGINE 15 UNITS: 100 INJECTION, SOLUTION SUBCUTANEOUS at 09:39

## 2025-02-01 RX ADMIN — SODIUM CHLORIDE, PRESERVATIVE FREE 10 ML: 5 INJECTION INTRAVENOUS at 09:41

## 2025-02-01 ASSESSMENT — PAIN DESCRIPTION - ORIENTATION: ORIENTATION: LOWER

## 2025-02-01 ASSESSMENT — PAIN SCALES - GENERAL
PAINLEVEL_OUTOF10: 2
PAINLEVEL_OUTOF10: 6

## 2025-02-01 ASSESSMENT — PAIN DESCRIPTION - LOCATION: LOCATION: BACK

## 2025-02-01 ASSESSMENT — PAIN DESCRIPTION - DESCRIPTORS: DESCRIPTORS: ACHING

## 2025-02-01 ASSESSMENT — PAIN - FUNCTIONAL ASSESSMENT: PAIN_FUNCTIONAL_ASSESSMENT: PREVENTS OR INTERFERES SOME ACTIVE ACTIVITIES AND ADLS

## 2025-02-01 NOTE — DISCHARGE INSTRUCTIONS
HOSPITALIST RECOMMENDATIONS    Complete Tamiflu - need 7 more doses  Taper Prednisone over 6 days  Use Albuterol inhaler four times daily for next week  Mother should receive Tamiflu daily for 7 days  Monitor Home Oximetry - need to keep O2 sat > 90%  Monitor Temperature twice daily  DINA LEMA MD   [phone]

## 2025-02-01 NOTE — PROGRESS NOTES
[facility]  Hospitalist Progress Note    NAME: Michael Ledbetter   :  1975   MRN:  [id number]     Total duration of encounter: 2 days      Interim Hospital Summary: 49 y.o. male who presented on 2025 with Acute hypoxic respiratory failure. He has a past medical history of Anxiety, Diabetes (HCC), HPV in male, Hypertension, Insomnia, Sinus problem, Stress, Type 2 diabetes mellitus without complication, without long-term current use of insulin (HCC), and Type 2 diabetes with nephropathy (HCC)..       PMHx significant for insulin-dependent diabetes mellitus, hypertension, hyperlipidemia and tobacco use who presents to the emergency department complaining of cough and shortness of breath for the last several days. Of note, he presented to the emergency department on 2025 where he was diagnosed with influenza A and noted to be hypoxic requiring 4 L of oxygen via nasal cannula.      Subjective:     Chief Complaint / Reason for Physician Visit  \"better\".  Discussed with RN   Less SOB, O2 weaned to 4 l/min NC  Has O2 concentrator at home Rx for mother  No sputum, No wheezing today  Limited cough    Review of Systems:  Symptom Y/N Comments  Symptom Y/N Comments   Fever/Chills n   Chest Pain n    Poor Appetite n   Edema n    Cough y   Abdominal Pain n    Sputum n   Joint Pain n    SOB/CALDERA n  On 4 l/min  Pruritis/Rash n    Nausea/vomit n   Tolerating PT/OT y    Diarrhea n   Tolerating Diet y    Constipation n   Other         Current Facility-Administered Medications:     hydrALAZINE (APRESOLINE) injection 10 mg, 10 mg, IntraVENous, Q6H PRN, Sundar Vines MD, 10 mg at 25 0603    sodium chloride flush 0.9 % injection 5-40 mL, 5-40 mL, IntraVENous, 2 times per day, Sundar Vines MD, 10 mL at 25 0941    sodium chloride flush 0.9 % injection 5-40 mL, 5-40 mL, IntraVENous, PRN, Sundar Vines MD    0.9 % sodium chloride infusion, , IntraVENous, PRN, Sundar Vines MD

## 2025-02-01 NOTE — RT PROTOCOL NOTE
RT Inhaler-Nebulizer Bronchodilator Protocol Note    There is a bronchodilator order in the chart from a provider indicating to follow the RT Bronchodilator Protocol and there is an “Initiate RT Inhaler-Nebulizer Bronchodilator Protocol” order as well (see protocol at bottom of note).    CXR Findings:  XR CHEST PORTABLE    Result Date: 1/30/2025  Right greater than left airspace disease. Electronically signed by Sergio Shelby      The findings from the last RT Protocol Assessment were as follows:   History Pulmonary Disease: Smoker 15 pack years or more  Respiratory Pattern: Dyspnea on exertion or RR 21-25 bpm  Breath Sounds: Slightly diminished and/or crackles  Cough: Strong, spontaneous, non-productive  Indication for Bronchodilator Therapy:    Bronchodilator Assessment Score: 5    Aerosolized bronchodilator medication orders have been revised according to the RT Inhaler-Nebulizer Bronchodilator Protocol below.    Respiratory Therapist to perform RT Therapy Protocol Assessment initially then follow the protocol.  Repeat RT Therapy Protocol Assessment PRN for score 0-3 or on second treatment, BID, and PRN for scores above 3.    No Indications - adjust the frequency to every 6 hours PRN wheezing or bronchospasm, if no treatments needed after 48 hours then discontinue using Per Protocol order mode.     If indication present, adjust the RT bronchodilator orders based on the Bronchodilator Assessment Score as indicated below.  Use Inhaler orders unless patient has one or more of the following: on home nebulizer, not able to hold breath for 10 seconds, is not alert and oriented, cannot activate and use MDI correctly, or respiratory rate 25 breaths per minute or more, then use the equivalent nebulizer order(s) with same Frequency and PRN reasons based on the score.  If a patient is on this medication at home then do not decrease Frequency below that used at home.    0-3 - enter or revise RT bronchodilator order(s) to

## 2025-02-01 NOTE — RT PROTOCOL NOTE
RT Inhaler-Nebulizer Bronchodilator Protocol Note    There is a bronchodilator order in the chart from a provider indicating to follow the RT Bronchodilator Protocol and there is an “Initiate RT Inhaler-Nebulizer Bronchodilator Protocol” order as well (see protocol at bottom of note).    CXR Findings:  XR CHEST PORTABLE    Result Date: 1/30/2025  Right greater than left airspace disease. Electronically signed by Sergio Shelby      The findings from the last RT Protocol Assessment were as follows:   History Pulmonary Disease:    Respiratory Pattern:    Breath Sounds:    Cough:    Indication for Bronchodilator Therapy:    Bronchodilator Assessment Score:      Aerosolized bronchodilator medication orders have been revised according to the RT Inhaler-Nebulizer Bronchodilator Protocol below.    Respiratory Therapist to perform RT Therapy Protocol Assessment initially then follow the protocol.  Repeat RT Therapy Protocol Assessment PRN for score 0-3 or on second treatment, BID, and PRN for scores above 3.    No Indications - adjust the frequency to every 6 hours PRN wheezing or bronchospasm, if no treatments needed after 48 hours then discontinue using Per Protocol order mode.     If indication present, adjust the RT bronchodilator orders based on the Bronchodilator Assessment Score as indicated below.  Use Inhaler orders unless patient has one or more of the following: on home nebulizer, not able to hold breath for 10 seconds, is not alert and oriented, cannot activate and use MDI correctly, or respiratory rate 25 breaths per minute or more, then use the equivalent nebulizer order(s) with same Frequency and PRN reasons based on the score.  If a patient is on this medication at home then do not decrease Frequency below that used at home.    0-3 - enter or revise RT bronchodilator order(s) to equivalent RT Bronchodilator order with Frequency of every 4 hours PRN for wheezing or increased work of breathing using Per Protocol

## 2025-02-01 NOTE — DISCHARGE SUMMARY
[facility]  Hospitalist Discharge Summary    Patient ID:  Michael Ledbetter  [id number]  49 y.o.  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  male with PMHx significant for insulin-dependent diabetes mellitus, hypertension, hyperlipidemia and tobacco use who presents to the emergency department complaining of cough and shortness of breath for the last several days.  Of note, he presented to the emergency department on 1/29/2025 where he was diagnosed with influenza A and noted to be hypoxic requiring 4 L of oxygen via nasal cannula.  He left the hospital AGAINST MEDICAL ADVICE at this time and returned after he continued to be shortness of breath and a cough.  He states that he smokes about a pack per day of cigarettes but does not have a formal diagnosis of COPD.  He also complains of subjective fever and chills, nausea and diarrhea.  He denies any chest pain, palpitations or lower extremity swelling.  He has no other complaints at this time.     In the emergency department, he was found to have a temperature of 100.5 °F and was hypoxic in the 70s requiring high flow oxygen via nasal cannula to maintain his oxygen saturations.  A CBC was unremarkable.  A CT MP was significant for a blood sugar of 359, BUN of 41 and creatinine of 2.6 compared to a baseline creatinine of 1.2.  BNP was 5400 and troponin was 32.  The CT chest without IV contrast was concerning for multifocal pneumonia.  He was given ceftriaxone and azithromycin in the emergency department and started on high flow nasal

## 2025-02-01 NOTE — PLAN OF CARE
Problem: Chronic Conditions and Co-morbidities  Goal: Patient's chronic conditions and co-morbidity symptoms are monitored and maintained or improved  Outcome: Adequate for Discharge  Flowsheets (Taken 2/1/2025 0800)  Care Plan - Patient's Chronic Conditions and Co-Morbidity Symptoms are Monitored and Maintained or Improved:   Monitor and assess patient's chronic conditions and comorbid symptoms for stability, deterioration, or improvement   Update acute care plan with appropriate goals if chronic or comorbid symptoms are exacerbated and prevent overall improvement and discharge   Collaborate with multidisciplinary team to address chronic and comorbid conditions and prevent exacerbation or deterioration     Problem: Pain  Goal: Verbalizes/displays adequate comfort level or baseline comfort level  Outcome: Adequate for Discharge     Problem: Respiratory - Adult  Goal: Achieves optimal ventilation and oxygenation  2/1/2025 1217 by Maryellen Reeves, RN  Outcome: Adequate for Discharge  2/1/2025 1059 by Reina Bui RCP  Outcome: Progressing  Flowsheets (Taken 2/1/2025 0800 by Maryellen Reeves, RN)  Achieves optimal ventilation and oxygenation:   Assess for changes in respiratory status   Oxygen supplementation based on oxygen saturation or arterial blood gases   Respiratory therapy support as indicated     Problem: Safety - Adult  Goal: Free from fall injury  Outcome: Adequate for Discharge

## 2025-02-01 NOTE — PROGRESS NOTES
02/01/25 1000   Resting (Room Air)   SpO2 97   Resting (On O2)   SpO2 97      O2 Device Nasal cannula   O2 Flow Rate (l/min) 2 l/min   During Walk (Room Air)   SpO2 96      Walk/Assistance Device   (none)   Rate of Dyspnea 1   Symptoms Other (comment)  (none)   After Walk   SpO2 93      FIO2 (%) 21   Rate of Dyspnea 0   Symptoms   (none)   Does the Patient Qualify for Home O2 No

## 2025-02-01 NOTE — PLAN OF CARE
Problem: Respiratory - Adult  Goal: Achieves optimal ventilation and oxygenation  1/31/2025 2101 by Nicolette Hannon, RT  Outcome: Progressing  Flowsheets (Taken 1/31/2025 1005 by Hong Singh, RN)  Achieves optimal ventilation and oxygenation: Assess for changes in respiratory status  1/31/2025 0952 by Vicki Burnett, RT  Outcome: Progressing

## 2025-02-01 NOTE — PLAN OF CARE
Problem: Respiratory - Adult  Goal: Achieves optimal ventilation and oxygenation  2/1/2025 1059 by Reina Bui RCP  Outcome: Progressing  1/31/2025 2101 by Nicolette Hannon RT  Outcome: Progressing  Flowsheets  Taken 1/31/2025 2000 by Cece Montes, RN  Achieves optimal ventilation and oxygenation: Assess for changes in respiratory status  Taken 1/31/2025 1005 by Hong Singh RN  Achieves optimal ventilation and oxygenation: Assess for changes in respiratory status

## 2025-02-02 LAB
BACTERIA SPEC CULT: NORMAL
SERVICE CMNT-IMP: NORMAL

## 2025-02-04 LAB — L PNEUMO1 AG UR QL IA: NEGATIVE

## 2025-05-11 ENCOUNTER — HOSPITAL ENCOUNTER (EMERGENCY)
Facility: HOSPITAL | Age: 50
Discharge: ANOTHER HOSPITAL | End: 2025-05-12
Attending: EMERGENCY MEDICINE
Payer: COMMERCIAL

## 2025-05-11 ENCOUNTER — APPOINTMENT (OUTPATIENT)
Facility: HOSPITAL | Age: 50
End: 2025-05-11
Payer: COMMERCIAL

## 2025-05-11 DIAGNOSIS — I10 HYPERTENSION, UNSPECIFIED TYPE: ICD-10-CM

## 2025-05-11 DIAGNOSIS — N17.9 ACUTE KIDNEY INJURY: Primary | ICD-10-CM

## 2025-05-11 DIAGNOSIS — N04.9 NEPHROTIC SYNDROME: ICD-10-CM

## 2025-05-11 LAB
BASOPHILS # BLD: 0.04 K/UL (ref 0–0.1)
BASOPHILS NFR BLD: 0.4 % (ref 0–1)
DIFFERENTIAL METHOD BLD: NORMAL
EOSINOPHIL # BLD: 0.11 K/UL (ref 0–0.4)
EOSINOPHIL NFR BLD: 1.1 % (ref 0–7)
ERYTHROCYTE [DISTWIDTH] IN BLOOD BY AUTOMATED COUNT: 13.4 % (ref 11.5–14.5)
HCT VFR BLD AUTO: 39.2 % (ref 36.6–50.3)
HGB BLD-MCNC: 13.1 G/DL (ref 12.1–17)
IMM GRANULOCYTES # BLD AUTO: 0.02 K/UL (ref 0–0.04)
IMM GRANULOCYTES NFR BLD AUTO: 0.2 % (ref 0–0.5)
LYMPHOCYTES # BLD: 1.92 K/UL (ref 0.8–3.5)
LYMPHOCYTES NFR BLD: 19.3 % (ref 12–49)
MCH RBC QN AUTO: 30.4 PG (ref 26–34)
MCHC RBC AUTO-ENTMCNC: 33.4 G/DL (ref 30–36.5)
MCV RBC AUTO: 91 FL (ref 80–99)
MONOCYTES # BLD: 0.69 K/UL (ref 0–1)
MONOCYTES NFR BLD: 6.9 % (ref 5–13)
NEUTS SEG # BLD: 7.15 K/UL (ref 1.8–8)
NEUTS SEG NFR BLD: 72.1 % (ref 32–75)
NRBC # BLD: 0 K/UL (ref 0–0.01)
NRBC BLD-RTO: 0 PER 100 WBC
PLATELET # BLD AUTO: 243 K/UL (ref 150–400)
PMV BLD AUTO: 9.5 FL (ref 8.9–12.9)
RBC # BLD AUTO: 4.31 M/UL (ref 4.1–5.7)
WBC # BLD AUTO: 9.9 K/UL (ref 4.1–11.1)

## 2025-05-11 PROCEDURE — 80053 COMPREHEN METABOLIC PANEL: CPT

## 2025-05-11 PROCEDURE — 85025 COMPLETE CBC W/AUTO DIFF WBC: CPT

## 2025-05-11 PROCEDURE — 83880 ASSAY OF NATRIURETIC PEPTIDE: CPT

## 2025-05-11 PROCEDURE — 6360000002 HC RX W HCPCS: Performed by: EMERGENCY MEDICINE

## 2025-05-11 PROCEDURE — 93005 ELECTROCARDIOGRAM TRACING: CPT | Performed by: EMERGENCY MEDICINE

## 2025-05-11 PROCEDURE — 84484 ASSAY OF TROPONIN QUANT: CPT

## 2025-05-11 PROCEDURE — 96374 THER/PROPH/DIAG INJ IV PUSH: CPT

## 2025-05-11 PROCEDURE — 71045 X-RAY EXAM CHEST 1 VIEW: CPT

## 2025-05-11 PROCEDURE — 36415 COLL VENOUS BLD VENIPUNCTURE: CPT

## 2025-05-11 PROCEDURE — 99285 EMERGENCY DEPT VISIT HI MDM: CPT

## 2025-05-11 RX ORDER — BUMETANIDE 0.25 MG/ML
0.5 INJECTION, SOLUTION INTRAMUSCULAR; INTRAVENOUS
Status: COMPLETED | OUTPATIENT
Start: 2025-05-11 | End: 2025-05-12

## 2025-05-11 RX ORDER — VERAPAMIL HYDROCHLORIDE 80 MG/1
80 TABLET ORAL
Status: COMPLETED | OUTPATIENT
Start: 2025-05-11 | End: 2025-05-12

## 2025-05-11 RX ORDER — FENTANYL CITRATE 50 UG/ML
50 INJECTION, SOLUTION INTRAMUSCULAR; INTRAVENOUS
Refills: 0 | Status: COMPLETED | OUTPATIENT
Start: 2025-05-11 | End: 2025-05-11

## 2025-05-11 RX ADMIN — FENTANYL CITRATE 50 MCG: 50 INJECTION INTRAMUSCULAR; INTRAVENOUS at 23:47

## 2025-05-11 ASSESSMENT — PAIN SCALES - GENERAL: PAINLEVEL_OUTOF10: 8

## 2025-05-11 ASSESSMENT — PAIN DESCRIPTION - LOCATION: LOCATION: BACK

## 2025-05-12 VITALS
HEIGHT: 71 IN | HEART RATE: 80 BPM | SYSTOLIC BLOOD PRESSURE: 200 MMHG | TEMPERATURE: 98.1 F | BODY MASS INDEX: 24.92 KG/M2 | OXYGEN SATURATION: 98 % | DIASTOLIC BLOOD PRESSURE: 103 MMHG | WEIGHT: 178 LBS | RESPIRATION RATE: 16 BRPM

## 2025-05-12 LAB
ALBUMIN SERPL-MCNC: 2 G/DL (ref 3.5–5)
ALBUMIN/GLOB SERPL: 0.7 (ref 1.1–2.2)
ALP SERPL-CCNC: 134 U/L (ref 45–117)
ALT SERPL-CCNC: 20 U/L (ref 12–78)
AMPHET UR QL SCN: NEGATIVE
ANION GAP SERPL CALC-SCNC: 3 MMOL/L (ref 2–12)
APPEARANCE UR: CLEAR
AST SERPL-CCNC: 18 U/L (ref 15–37)
BACTERIA URNS QL MICRO: NEGATIVE /HPF
BARBITURATES UR QL SCN: NEGATIVE
BENZODIAZ UR QL: NEGATIVE
BILIRUB SERPL-MCNC: 0.2 MG/DL (ref 0.2–1)
BILIRUB UR QL: NEGATIVE
BUN SERPL-MCNC: 22 MG/DL (ref 6–20)
BUN/CREAT SERPL: 12 (ref 12–20)
CALCIUM SERPL-MCNC: 8.5 MG/DL (ref 8.5–10.1)
CANNABINOIDS UR QL SCN: NEGATIVE
CHLORIDE SERPL-SCNC: 106 MMOL/L (ref 97–108)
CO2 SERPL-SCNC: 32 MMOL/L (ref 21–32)
COCAINE UR QL SCN: NEGATIVE
COLOR UR: ABNORMAL
CREAT SERPL-MCNC: 1.87 MG/DL (ref 0.7–1.3)
EKG ATRIAL RATE: 80 BPM
EKG DIAGNOSIS: NORMAL
EKG P AXIS: 73 DEGREES
EKG P-R INTERVAL: 166 MS
EKG Q-T INTERVAL: 388 MS
EKG QRS DURATION: 76 MS
EKG QTC CALCULATION (BAZETT): 447 MS
EKG R AXIS: 24 DEGREES
EKG T AXIS: 103 DEGREES
EKG VENTRICULAR RATE: 80 BPM
EPITH CASTS URNS QL MICRO: ABNORMAL /LPF
GLOBULIN SER CALC-MCNC: 2.7 G/DL (ref 2–4)
GLUCOSE SERPL-MCNC: 125 MG/DL (ref 65–100)
GLUCOSE UR STRIP.AUTO-MCNC: NEGATIVE MG/DL
HGB UR QL STRIP: ABNORMAL
KETONES UR QL STRIP.AUTO: NEGATIVE MG/DL
LEUKOCYTE ESTERASE UR QL STRIP.AUTO: NEGATIVE
Lab: NORMAL
METHADONE UR QL: NEGATIVE
NITRITE UR QL STRIP.AUTO: NEGATIVE
NT PRO BNP: 3340 PG/ML (ref 0–125)
OPIATES UR QL: NEGATIVE
PCP UR QL: NEGATIVE
PH UR STRIP: 6.5 (ref 5–8)
POTASSIUM SERPL-SCNC: 4.1 MMOL/L (ref 3.5–5.1)
PROT SERPL-MCNC: 4.7 G/DL (ref 6.4–8.2)
PROT UR STRIP-MCNC: >300 MG/DL
RBC #/AREA URNS HPF: ABNORMAL /HPF (ref 0–5)
SODIUM SERPL-SCNC: 141 MMOL/L (ref 136–145)
SP GR UR REFRACTOMETRY: 1.02 (ref 1–1.03)
TROPONIN I SERPL HS-MCNC: 21 NG/L (ref 0–76)
URINE CULTURE IF INDICATED: ABNORMAL
UROBILINOGEN UR QL STRIP.AUTO: 0.2 EU/DL (ref 0.2–1)
WBC URNS QL MICRO: ABNORMAL /HPF (ref 0–4)

## 2025-05-12 PROCEDURE — 81001 URINALYSIS AUTO W/SCOPE: CPT

## 2025-05-12 PROCEDURE — 6370000000 HC RX 637 (ALT 250 FOR IP): Performed by: EMERGENCY MEDICINE

## 2025-05-12 PROCEDURE — 96375 TX/PRO/DX INJ NEW DRUG ADDON: CPT

## 2025-05-12 PROCEDURE — 80307 DRUG TEST PRSMV CHEM ANLYZR: CPT

## 2025-05-12 PROCEDURE — 6360000002 HC RX W HCPCS: Performed by: EMERGENCY MEDICINE

## 2025-05-12 RX ORDER — LABETALOL HYDROCHLORIDE 5 MG/ML
10 INJECTION, SOLUTION INTRAVENOUS
Status: COMPLETED | OUTPATIENT
Start: 2025-05-12 | End: 2025-05-12

## 2025-05-12 RX ORDER — CLONIDINE HYDROCHLORIDE 0.1 MG/1
0.2 TABLET ORAL
Status: COMPLETED | OUTPATIENT
Start: 2025-05-12 | End: 2025-05-12

## 2025-05-12 RX ORDER — LABETALOL HYDROCHLORIDE 5 MG/ML
10 INJECTION, SOLUTION INTRAVENOUS
Status: DISCONTINUED | OUTPATIENT
Start: 2025-05-12 | End: 2025-05-12

## 2025-05-12 RX ORDER — SPIRONOLACTONE 25 MG/1
25 TABLET ORAL
Status: COMPLETED | OUTPATIENT
Start: 2025-05-12 | End: 2025-05-12

## 2025-05-12 RX ADMIN — LABETALOL HYDROCHLORIDE 10 MG: 5 INJECTION, SOLUTION INTRAVENOUS at 01:37

## 2025-05-12 RX ADMIN — BUMETANIDE 0.5 MG: 0.25 INJECTION INTRAMUSCULAR; INTRAVENOUS at 00:33

## 2025-05-12 RX ADMIN — CLONIDINE HYDROCHLORIDE 0.2 MG: 0.1 TABLET ORAL at 03:00

## 2025-05-12 RX ADMIN — SPIRONOLACTONE 25 MG: 25 TABLET ORAL at 03:00

## 2025-05-12 RX ADMIN — VERAPAMIL HYDROCHLORIDE 80 MG: 80 TABLET ORAL at 00:33

## 2025-05-12 NOTE — ED TRIAGE NOTES
Arrived ambulatory with c/o high blood pressure and leg swelling . Was at another facility and states \" I signed myself out \"

## 2025-05-12 NOTE — ED NOTES
TRANSFER - OUT REPORT:    Verbal report given to thalia kasper on Michael Ledbetter  being transferred to [facility]  for routine progression of patient care       Report consisted of patient's Situation, Background, Assessment and   Recommendations(SBAR).     Information from the following report(s) ED SBAR was reviewed with the receiving nurse.    Kinder Fall Assessment:                           Lines:   Peripheral IV 05/11/25 Right Forearm (Active)   Site Assessment Clean, dry & intact 05/11/25 2333   Line Status Blood return noted;Flushed;Specimen collected;Normal saline locked 05/11/25 2333   Phlebitis Assessment No symptoms 05/11/25 2333   Infiltration Assessment 0 05/11/25 2333        Opportunity for questions and clarification was provided.      Patient transported with:  VASHTI

## 2025-05-12 NOTE — ED PROVIDER NOTES
[facility] EMERGENCY DEPARTMENT  EMERGENCY DEPARTMENT ENCOUNTER       Pt Name: Michael Ledbetter  MRN: [id number]  Birthdate 1975  Date of evaluation: 5/11/2025  Provider: Gina Hensley MD   PCP: Ricky Galvez MD  Note Started: 2:28 AM EDT 5/12/25     CHIEF COMPLAINT       Chief Complaint   Patient presents with    Hypertension        HISTORY OF PRESENT ILLNESS: 1 or more elements      History From: Patient and Patient's Wife  HPI Limitations: None     Michael Ledbetter is a 49 y.o. male with history of anxiety, diabetes, hypertension who presents to the ED with leg swelling for the past 2 to 3 weeks and elevated blood pressure.  Patient was admitted at [facility] until the morning of 5/11/2025 when he signed himself out AGAINST MEDICAL ADVICE from ICU where he had been on intermittent nicardipine drip for his blood pressure.  While he was admitted, he was evaluated by nephrology who adjusted his medications.  He was not discharged on any new medications because he left against medical vice before physician had a chance to speak to him.  Since he went home, his blood pressures have been elevated.  He went back to his usual blood pressure regimen which included amlodipine 10 mg daily and carvedilol 12.5 mg twice daily and a clonidine 0.3 mg patch.  Patient denies any chest pain, shortness of breath, numbness, tingling, weakness, dizziness.  Wife reports that patient left [facility] and came here for a \"second opinion\".  I discussed with patient that we do not have nephrology or NICU at this facility.  REVIEW OF SYSTEMS      Review of Systems     Positives and Pertinent negatives as per HPI.    PAST HISTORY     Past Medical History:  Past Medical History:   Diagnosis Date    Anxiety     Diabetes (HCC)     HPV in male     Hypertension     Insomnia     Sinus problem     Stress     Type 2 diabetes mellitus without complication, without long-term current use of insulin ([facility]) 2/28/2017    Type

## 2025-06-15 ENCOUNTER — APPOINTMENT (OUTPATIENT)
Facility: HOSPITAL | Age: 50
End: 2025-06-15
Payer: COMMERCIAL

## 2025-06-15 ENCOUNTER — HOSPITAL ENCOUNTER (EMERGENCY)
Facility: HOSPITAL | Age: 50
Discharge: HOME | End: 2025-06-15
Attending: EMERGENCY MEDICINE
Payer: COMMERCIAL

## 2025-06-15 VITALS
HEIGHT: 69 IN | TEMPERATURE: 97.9 F | HEART RATE: 82 BPM | OXYGEN SATURATION: 94 % | BODY MASS INDEX: 26.36 KG/M2 | DIASTOLIC BLOOD PRESSURE: 112 MMHG | RESPIRATION RATE: 17 BRPM | SYSTOLIC BLOOD PRESSURE: 207 MMHG | WEIGHT: 178 LBS

## 2025-06-15 DIAGNOSIS — I10 ESSENTIAL HYPERTENSION: ICD-10-CM

## 2025-06-15 DIAGNOSIS — K52.9 ENTERITIS: Primary | ICD-10-CM

## 2025-06-15 LAB
ALBUMIN SERPL-MCNC: 2.1 G/DL (ref 3.5–5)
ALBUMIN/GLOB SERPL: 0.6 (ref 1.1–2.2)
ALP SERPL-CCNC: 158 U/L (ref 45–117)
ALT SERPL-CCNC: 28 U/L (ref 12–78)
ANION GAP SERPL CALC-SCNC: 10 MMOL/L (ref 2–12)
AST SERPL-CCNC: 23 U/L (ref 15–37)
BASE DEFICIT BLDV-SCNC: 0.2 MMOL/L
BASOPHILS # BLD: 0.06 K/UL (ref 0–0.1)
BASOPHILS NFR BLD: 0.5 % (ref 0–1)
BDY SITE: ABNORMAL
BILIRUB SERPL-MCNC: 0.4 MG/DL (ref 0.2–1)
BUN SERPL-MCNC: 23 MG/DL (ref 6–20)
BUN/CREAT SERPL: 11 (ref 12–20)
CALCIUM SERPL-MCNC: 8.9 MG/DL (ref 8.5–10.1)
CHLORIDE SERPL-SCNC: 101 MMOL/L (ref 97–108)
CO2 SERPL-SCNC: 26 MMOL/L (ref 21–32)
CREAT SERPL-MCNC: 2.03 MG/DL (ref 0.7–1.3)
DIFFERENTIAL METHOD BLD: ABNORMAL
EOSINOPHIL # BLD: 0.03 K/UL (ref 0–0.4)
EOSINOPHIL NFR BLD: 0.3 % (ref 0–7)
ERYTHROCYTE [DISTWIDTH] IN BLOOD BY AUTOMATED COUNT: 12.9 % (ref 11.5–14.5)
GLOBULIN SER CALC-MCNC: 3.7 G/DL (ref 2–4)
GLUCOSE SERPL-MCNC: 295 MG/DL (ref 65–100)
HCO3 BLDV-SCNC: 26 MMOL/L (ref 23–28)
HCT VFR BLD AUTO: 46.8 % (ref 36.6–50.3)
HGB BLD-MCNC: 15.8 G/DL (ref 12.1–17)
IMM GRANULOCYTES # BLD AUTO: 0.04 K/UL (ref 0–0.04)
IMM GRANULOCYTES NFR BLD AUTO: 0.3 % (ref 0–0.5)
LACTATE SERPL-SCNC: 1 MMOL/L (ref 0.4–2)
LACTATE SERPL-SCNC: 2.7 MMOL/L (ref 0.4–2)
LIPASE SERPL-CCNC: 17 U/L (ref 13–75)
LYMPHOCYTES # BLD: 1.84 K/UL (ref 0.8–3.5)
LYMPHOCYTES NFR BLD: 15.9 % (ref 12–49)
MCH RBC QN AUTO: 30.3 PG (ref 26–34)
MCHC RBC AUTO-ENTMCNC: 33.8 G/DL (ref 30–36.5)
MCV RBC AUTO: 89.8 FL (ref 80–99)
MONOCYTES # BLD: 0.54 K/UL (ref 0–1)
MONOCYTES NFR BLD: 4.7 % (ref 5–13)
NEUTS SEG # BLD: 9.05 K/UL (ref 1.8–8)
NEUTS SEG NFR BLD: 78.3 % (ref 32–75)
NRBC # BLD: 0 K/UL (ref 0–0.01)
NRBC BLD-RTO: 0 PER 100 WBC
PCO2 BLDV: 45.7 MMHG (ref 41–51)
PH BLDV: 7.37 (ref 7.32–7.42)
PLATELET # BLD AUTO: 325 K/UL (ref 150–400)
PMV BLD AUTO: 9.5 FL (ref 8.9–12.9)
PO2 BLDV: 21 MMHG (ref 25–40)
POTASSIUM SERPL-SCNC: 3.9 MMOL/L (ref 3.5–5.1)
PROT SERPL-MCNC: 5.8 G/DL (ref 6.4–8.2)
RBC # BLD AUTO: 5.21 M/UL (ref 4.1–5.7)
SAO2 % BLDV: 32 % (ref 65–88)
SAO2% DEVICE SAO2% SENSOR NAME: ABNORMAL
SODIUM SERPL-SCNC: 137 MMOL/L (ref 136–145)
SPECIMEN SITE: ABNORMAL
WBC # BLD AUTO: 11.6 K/UL (ref 4.1–11.1)

## 2025-06-15 PROCEDURE — 96374 THER/PROPH/DIAG INJ IV PUSH: CPT

## 2025-06-15 PROCEDURE — 83690 ASSAY OF LIPASE: CPT

## 2025-06-15 PROCEDURE — 99284 EMERGENCY DEPT VISIT MOD MDM: CPT

## 2025-06-15 PROCEDURE — 85025 COMPLETE CBC W/AUTO DIFF WBC: CPT

## 2025-06-15 PROCEDURE — 96375 TX/PRO/DX INJ NEW DRUG ADDON: CPT

## 2025-06-15 PROCEDURE — 74176 CT ABD & PELVIS W/O CONTRAST: CPT

## 2025-06-15 PROCEDURE — 36415 COLL VENOUS BLD VENIPUNCTURE: CPT

## 2025-06-15 PROCEDURE — 6360000002 HC RX W HCPCS: Performed by: EMERGENCY MEDICINE

## 2025-06-15 PROCEDURE — 80053 COMPREHEN METABOLIC PANEL: CPT

## 2025-06-15 PROCEDURE — 2580000003 HC RX 258: Performed by: EMERGENCY MEDICINE

## 2025-06-15 PROCEDURE — 83605 ASSAY OF LACTIC ACID: CPT

## 2025-06-15 PROCEDURE — 93005 ELECTROCARDIOGRAM TRACING: CPT | Performed by: EMERGENCY MEDICINE

## 2025-06-15 PROCEDURE — 6370000000 HC RX 637 (ALT 250 FOR IP): Performed by: EMERGENCY MEDICINE

## 2025-06-15 PROCEDURE — 82803 BLOOD GASES ANY COMBINATION: CPT

## 2025-06-15 RX ORDER — ONDANSETRON 2 MG/ML
4 INJECTION INTRAMUSCULAR; INTRAVENOUS ONCE
Status: COMPLETED | OUTPATIENT
Start: 2025-06-15 | End: 2025-06-15

## 2025-06-15 RX ORDER — ISOSORBIDE DINITRATE 10 MG/1
20 TABLET ORAL ONCE
Status: DISCONTINUED | OUTPATIENT
Start: 2025-06-15 | End: 2025-06-15 | Stop reason: HOSPADM

## 2025-06-15 RX ORDER — PROMETHAZINE HYDROCHLORIDE 25 MG/1
25 SUPPOSITORY RECTAL EVERY 6 HOURS PRN
Qty: 12 SUPPOSITORY | Refills: 0 | Status: SHIPPED | OUTPATIENT
Start: 2025-06-15 | End: 2025-06-22

## 2025-06-15 RX ORDER — DROPERIDOL 2.5 MG/ML
1.25 INJECTION, SOLUTION INTRAMUSCULAR; INTRAVENOUS ONCE
Status: COMPLETED | OUTPATIENT
Start: 2025-06-15 | End: 2025-06-15

## 2025-06-15 RX ORDER — ONDANSETRON 8 MG/1
8 TABLET, ORALLY DISINTEGRATING ORAL EVERY 8 HOURS PRN
Qty: 15 TABLET | Refills: 0 | Status: SHIPPED | OUTPATIENT
Start: 2025-06-15

## 2025-06-15 RX ORDER — AMLODIPINE BESYLATE 10 MG/1
10 TABLET ORAL
Status: COMPLETED | OUTPATIENT
Start: 2025-06-15 | End: 2025-06-15

## 2025-06-15 RX ORDER — CARVEDILOL 6.25 MG/1
12.5 TABLET ORAL
Status: COMPLETED | OUTPATIENT
Start: 2025-06-15 | End: 2025-06-15

## 2025-06-15 RX ORDER — CLONIDINE HYDROCHLORIDE 0.1 MG/1
0.2 TABLET ORAL ONCE
Status: COMPLETED | OUTPATIENT
Start: 2025-06-15 | End: 2025-06-15

## 2025-06-15 RX ORDER — SPIRONOLACTONE 25 MG/1
25 TABLET ORAL DAILY
Status: DISCONTINUED | OUTPATIENT
Start: 2025-06-15 | End: 2025-06-15 | Stop reason: HOSPADM

## 2025-06-15 RX ORDER — 0.9 % SODIUM CHLORIDE 0.9 %
1000 INTRAVENOUS SOLUTION INTRAVENOUS ONCE
Status: COMPLETED | OUTPATIENT
Start: 2025-06-15 | End: 2025-06-15

## 2025-06-15 RX ORDER — MORPHINE SULFATE 4 MG/ML
4 INJECTION, SOLUTION INTRAMUSCULAR; INTRAVENOUS ONCE
Refills: 0 | Status: COMPLETED | OUTPATIENT
Start: 2025-06-15 | End: 2025-06-15

## 2025-06-15 RX ADMIN — CARVEDILOL 12.5 MG: 6.25 TABLET, FILM COATED ORAL at 16:44

## 2025-06-15 RX ADMIN — MORPHINE SULFATE 4 MG: 4 INJECTION INTRAVENOUS at 14:59

## 2025-06-15 RX ADMIN — DROPERIDOL 1.25 MG: 2.5 INJECTION, SOLUTION INTRAMUSCULAR; INTRAVENOUS at 15:00

## 2025-06-15 RX ADMIN — ONDANSETRON 4 MG: 2 INJECTION, SOLUTION INTRAMUSCULAR; INTRAVENOUS at 16:45

## 2025-06-15 RX ADMIN — SODIUM CHLORIDE 1000 ML: 0.9 INJECTION, SOLUTION INTRAVENOUS at 14:59

## 2025-06-15 RX ADMIN — CLONIDINE HYDROCHLORIDE 0.2 MG: 0.1 TABLET ORAL at 16:03

## 2025-06-15 RX ADMIN — SPIRONOLACTONE 25 MG: 25 TABLET ORAL at 16:03

## 2025-06-15 RX ADMIN — AMLODIPINE BESYLATE 10 MG: 10 TABLET ORAL at 16:45

## 2025-06-15 ASSESSMENT — PAIN - FUNCTIONAL ASSESSMENT
PAIN_FUNCTIONAL_ASSESSMENT: PREVENTS OR INTERFERES WITH ALL ACTIVE AND SOME PASSIVE ACTIVITIES
PAIN_FUNCTIONAL_ASSESSMENT: 0-10

## 2025-06-15 ASSESSMENT — PAIN DESCRIPTION - ORIENTATION: ORIENTATION: RIGHT

## 2025-06-15 ASSESSMENT — PAIN SCALES - GENERAL: PAINLEVEL_OUTOF10: 10

## 2025-06-15 ASSESSMENT — PAIN DESCRIPTION - LOCATION: LOCATION: ABDOMEN

## 2025-06-15 NOTE — ED NOTES
Pt has tolerated PO Challenge, I have reviewed discharge instructions with the patient and spouse. The patient and spouse verbalized understanding. Discharge medications discussed with patient. No questions at this time. Ambulated without difficulty.

## 2025-06-15 NOTE — ED NOTES
Pt wife at bedside, assisting pt with urinal. Pt is still having pain but concerned about continued nausea. ED Provider to be informed when available.

## 2025-06-15 NOTE — ED PROVIDER NOTES
[facility] EMERGENCY DEPARTMENT  EMERGENCY DEPARTMENT ENCOUNTER       Pt Name: Michael Ledbetter  MRN: [id number]  Birthdate 1975  Date of evaluation: 6/15/2025  Provider: Jackson Perez DO   PCP: Ricky Galvez MD  Note Started: 2:33 PM EDT 6/15/25     CHIEF COMPLAINT       Chief Complaint   Patient presents with    Abdominal Pain        HISTORY OF PRESENT ILLNESS: 1 or more elements      History From: Patient, History limited by: none     Michael Ledbetter is a 49 y.o. male past medical history significant for hypertension, poorly controlled presenting the emergency department complaining of abdominal pain, nausea and vomiting.  Symptoms started last night.  Patient attributes to potentially food he ate.  Denies any significant alcohol use, denies any recreational drug use.  Reports a normal bowel movement yesterday.       Please See MDM for Additional Details of the HPI/PMH  Nursing Notes were all reviewed and agreed with or any disagreements were addressed in the HPI.     REVIEW OF SYSTEMS        Positives and Pertinent negatives as per HPI.    PAST HISTORY     Past Medical History:  Past Medical History:   Diagnosis Date    Anxiety     Diabetes ([facility])     HPV in male     Hypertension     Insomnia     Sinus problem     Stress     Type 2 diabetes mellitus without complication, without long-term current use of insulin ([facility]) 2/28/2017    Type 2 diabetes with nephropathy ([facility]) 3/29/2018       Past Surgical History:  History reviewed. No pertinent surgical history.    Family History:  Family History   Problem Relation Age of Onset    Heart Disease Father     Suicide Brother        Social History:  Social History     Tobacco Use    Smoking status: Every Day     Current packs/day: 1.00     Types: Cigarettes    Smokeless tobacco: Never   Substance Use Topics    Alcohol use: No    Drug use: No       Allergies:  No Known Allergies    CURRENT MEDICATIONS      Discharge Medication List as of 6/15/2025  6:53 PM

## 2025-06-16 LAB
EKG ATRIAL RATE: 98 BPM
EKG DIAGNOSIS: NORMAL
EKG P AXIS: 76 DEGREES
EKG P-R INTERVAL: 172 MS
EKG Q-T INTERVAL: 352 MS
EKG QRS DURATION: 76 MS
EKG QTC CALCULATION (BAZETT): 449 MS
EKG R AXIS: 37 DEGREES
EKG T AXIS: 83 DEGREES
EKG VENTRICULAR RATE: 98 BPM